# Patient Record
Sex: FEMALE | Race: WHITE | NOT HISPANIC OR LATINO
[De-identification: names, ages, dates, MRNs, and addresses within clinical notes are randomized per-mention and may not be internally consistent; named-entity substitution may affect disease eponyms.]

---

## 2020-07-28 ENCOUNTER — APPOINTMENT (OUTPATIENT)
Dept: ANTEPARTUM | Facility: CLINIC | Age: 30
End: 2020-07-28
Payer: COMMERCIAL

## 2020-07-28 PROCEDURE — 76819 FETAL BIOPHYS PROFIL W/O NST: CPT

## 2020-07-28 PROCEDURE — 76815 OB US LIMITED FETUS(S): CPT

## 2020-09-24 ENCOUNTER — TRANSCRIPTION ENCOUNTER (OUTPATIENT)
Age: 30
End: 2020-09-24

## 2020-09-25 ENCOUNTER — RESULT REVIEW (OUTPATIENT)
Age: 30
End: 2020-09-25

## 2020-09-25 ENCOUNTER — INPATIENT (INPATIENT)
Facility: HOSPITAL | Age: 30
LOS: 3 days | Discharge: HOME | End: 2020-09-29
Attending: OBSTETRICS & GYNECOLOGY | Admitting: OBSTETRICS & GYNECOLOGY
Payer: COMMERCIAL

## 2020-09-25 VITALS — HEIGHT: 64 IN | WEIGHT: 167.33 LBS

## 2020-09-25 DIAGNOSIS — Z3A.40 40 WEEKS GESTATION OF PREGNANCY: ICD-10-CM

## 2020-09-25 DIAGNOSIS — O41.1230 CHORIOAMNIONITIS, THIRD TRIMESTER, NOT APPLICABLE OR UNSPECIFIED: ICD-10-CM

## 2020-09-25 DIAGNOSIS — O26.899 OTHER SPECIFIED PREGNANCY RELATED CONDITIONS, UNSPECIFIED TRIMESTER: ICD-10-CM

## 2020-09-25 DIAGNOSIS — Z3A.00 WEEKS OF GESTATION OF PREGNANCY NOT SPECIFIED: ICD-10-CM

## 2020-09-25 DIAGNOSIS — Z34.03 ENCOUNTER FOR SUPERVISION OF NORMAL FIRST PREGNANCY, THIRD TRIMESTER: ICD-10-CM

## 2020-09-25 LAB
BASOPHILS # BLD AUTO: 0.03 K/UL — SIGNIFICANT CHANGE UP (ref 0–0.2)
BASOPHILS NFR BLD AUTO: 0.2 % — SIGNIFICANT CHANGE UP (ref 0–2)
BLD GP AB SCN SERPL QL: NEGATIVE — SIGNIFICANT CHANGE UP
EOSINOPHIL # BLD AUTO: 0.02 K/UL — SIGNIFICANT CHANGE UP (ref 0–0.5)
EOSINOPHIL NFR BLD AUTO: 0.1 % — SIGNIFICANT CHANGE UP (ref 0–6)
HBV SURFACE AG SER-ACNC: SIGNIFICANT CHANGE UP
HCT VFR BLD CALC: 39.1 % — SIGNIFICANT CHANGE UP (ref 34.5–45)
HGB BLD-MCNC: 13.3 G/DL — SIGNIFICANT CHANGE UP (ref 11.5–15.5)
HIV 1+2 AB+HIV1 P24 AG SERPL QL IA: SIGNIFICANT CHANGE UP
IMM GRANULOCYTES NFR BLD AUTO: 0.8 % — SIGNIFICANT CHANGE UP (ref 0–1.5)
LYMPHOCYTES # BLD AUTO: 1.5 K/UL — SIGNIFICANT CHANGE UP (ref 1–3.3)
LYMPHOCYTES # BLD AUTO: 8.9 % — LOW (ref 13–44)
MCHC RBC-ENTMCNC: 31.4 PG — SIGNIFICANT CHANGE UP (ref 27–34)
MCHC RBC-ENTMCNC: 34 GM/DL — SIGNIFICANT CHANGE UP (ref 32–36)
MCV RBC AUTO: 92.2 FL — SIGNIFICANT CHANGE UP (ref 80–100)
MONOCYTES # BLD AUTO: 0.59 K/UL — SIGNIFICANT CHANGE UP (ref 0–0.9)
MONOCYTES NFR BLD AUTO: 3.5 % — SIGNIFICANT CHANGE UP (ref 2–14)
NEUTROPHILS # BLD AUTO: 14.6 K/UL — HIGH (ref 1.8–7.4)
NEUTROPHILS NFR BLD AUTO: 86.5 % — HIGH (ref 43–77)
NRBC # BLD: 0 /100 WBCS — SIGNIFICANT CHANGE UP (ref 0–0)
PLATELET # BLD AUTO: 156 K/UL — SIGNIFICANT CHANGE UP (ref 150–400)
RBC # BLD: 4.24 M/UL — SIGNIFICANT CHANGE UP (ref 3.8–5.2)
RBC # FLD: 12.9 % — SIGNIFICANT CHANGE UP (ref 10.3–14.5)
RH IG SCN BLD-IMP: POSITIVE — SIGNIFICANT CHANGE UP
RH IG SCN BLD-IMP: POSITIVE — SIGNIFICANT CHANGE UP
WBC # BLD: 16.87 K/UL — HIGH (ref 3.8–10.5)
WBC # FLD AUTO: 16.87 K/UL — HIGH (ref 3.8–10.5)

## 2020-09-25 PROCEDURE — 88307 TISSUE EXAM BY PATHOLOGIST: CPT | Mod: 26

## 2020-09-25 RX ORDER — IBUPROFEN 200 MG
600 TABLET ORAL EVERY 6 HOURS
Refills: 0 | Status: COMPLETED | OUTPATIENT
Start: 2020-09-25 | End: 2021-08-24

## 2020-09-25 RX ORDER — ACETAMINOPHEN 500 MG
975 TABLET ORAL
Refills: 0 | Status: DISCONTINUED | OUTPATIENT
Start: 2020-09-26 | End: 2020-09-29

## 2020-09-25 RX ORDER — ENOXAPARIN SODIUM 100 MG/ML
40 INJECTION SUBCUTANEOUS EVERY 24 HOURS
Refills: 0 | Status: DISCONTINUED | OUTPATIENT
Start: 2020-09-26 | End: 2020-09-29

## 2020-09-25 RX ORDER — OXYTOCIN 10 UNIT/ML
333.33 VIAL (ML) INJECTION
Qty: 20 | Refills: 0 | Status: DISCONTINUED | OUTPATIENT
Start: 2020-09-25 | End: 2020-09-25

## 2020-09-25 RX ORDER — SIMETHICONE 80 MG/1
80 TABLET, CHEWABLE ORAL EVERY 4 HOURS
Refills: 0 | Status: DISCONTINUED | OUTPATIENT
Start: 2020-09-26 | End: 2020-09-29

## 2020-09-25 RX ORDER — DIPHENHYDRAMINE HCL 50 MG
25 CAPSULE ORAL EVERY 6 HOURS
Refills: 0 | Status: DISCONTINUED | OUTPATIENT
Start: 2020-09-26 | End: 2020-09-29

## 2020-09-25 RX ORDER — ONDANSETRON 8 MG/1
4 TABLET, FILM COATED ORAL EVERY 6 HOURS
Refills: 0 | Status: DISCONTINUED | OUTPATIENT
Start: 2020-09-26 | End: 2020-09-29

## 2020-09-25 RX ORDER — MAGNESIUM HYDROXIDE 400 MG/1
30 TABLET, CHEWABLE ORAL
Refills: 0 | Status: DISCONTINUED | OUTPATIENT
Start: 2020-09-26 | End: 2020-09-29

## 2020-09-25 RX ORDER — CEFAZOLIN SODIUM 1 G
2000 VIAL (EA) INJECTION EVERY 8 HOURS
Refills: 0 | Status: DISCONTINUED | OUTPATIENT
Start: 2020-09-26 | End: 2020-09-29

## 2020-09-25 RX ORDER — SODIUM CHLORIDE 9 MG/ML
1000 INJECTION, SOLUTION INTRAVENOUS
Refills: 0 | Status: DISCONTINUED | OUTPATIENT
Start: 2020-09-25 | End: 2020-09-25

## 2020-09-25 RX ORDER — OXYCODONE HYDROCHLORIDE 5 MG/1
5 TABLET ORAL
Refills: 0 | Status: COMPLETED | OUTPATIENT
Start: 2020-09-26 | End: 2020-10-03

## 2020-09-25 RX ORDER — KETOROLAC TROMETHAMINE 30 MG/ML
30 SYRINGE (ML) INJECTION EVERY 6 HOURS
Refills: 0 | Status: COMPLETED | OUTPATIENT
Start: 2020-09-25 | End: 2020-09-26

## 2020-09-25 RX ORDER — DEXAMETHASONE 0.5 MG/5ML
4 ELIXIR ORAL EVERY 6 HOURS
Refills: 0 | Status: DISCONTINUED | OUTPATIENT
Start: 2020-09-26 | End: 2020-09-29

## 2020-09-25 RX ORDER — OXYTOCIN 10 UNIT/ML
333.33 VIAL (ML) INJECTION
Qty: 20 | Refills: 0 | Status: DISCONTINUED | OUTPATIENT
Start: 2020-09-26 | End: 2020-09-29

## 2020-09-25 RX ORDER — LANOLIN
1 OINTMENT (GRAM) TOPICAL EVERY 6 HOURS
Refills: 0 | Status: DISCONTINUED | OUTPATIENT
Start: 2020-09-26 | End: 2020-09-29

## 2020-09-25 RX ORDER — NALOXONE HYDROCHLORIDE 4 MG/.1ML
0.1 SPRAY NASAL
Refills: 0 | Status: DISCONTINUED | OUTPATIENT
Start: 2020-09-26 | End: 2020-09-29

## 2020-09-25 RX ORDER — OXYCODONE HYDROCHLORIDE 5 MG/1
5 TABLET ORAL ONCE
Refills: 0 | Status: DISCONTINUED | OUTPATIENT
Start: 2020-09-26 | End: 2020-09-29

## 2020-09-25 RX ORDER — FENTANYL/BUPIVACAINE/NS/PF 2MCG/ML-.1
250 PLASTIC BAG, INJECTION (ML) INJECTION
Refills: 0 | Status: DISCONTINUED | OUTPATIENT
Start: 2020-09-25 | End: 2020-09-25

## 2020-09-25 RX ORDER — CITRIC ACID/SODIUM CITRATE 300-500 MG
15 SOLUTION, ORAL ORAL EVERY 6 HOURS
Refills: 0 | Status: DISCONTINUED | OUTPATIENT
Start: 2020-09-25 | End: 2020-09-25

## 2020-09-25 RX ORDER — MORPHINE SULFATE 50 MG/1
4 CAPSULE, EXTENDED RELEASE ORAL ONCE
Refills: 0 | Status: DISCONTINUED | OUTPATIENT
Start: 2020-09-26 | End: 2020-09-29

## 2020-09-25 RX ORDER — TETANUS TOXOID, REDUCED DIPHTHERIA TOXOID AND ACELLULAR PERTUSSIS VACCINE, ADSORBED 5; 2.5; 8; 8; 2.5 [IU]/.5ML; [IU]/.5ML; UG/.5ML; UG/.5ML; UG/.5ML
0.5 SUSPENSION INTRAMUSCULAR ONCE
Refills: 0 | Status: DISCONTINUED | OUTPATIENT
Start: 2020-09-26 | End: 2020-09-29

## 2020-09-25 RX ORDER — SODIUM CHLORIDE 9 MG/ML
1000 INJECTION, SOLUTION INTRAVENOUS
Refills: 0 | Status: DISCONTINUED | OUTPATIENT
Start: 2020-09-26 | End: 2020-09-29

## 2020-09-25 RX ADMIN — Medication 30 MILLIGRAM(S): at 23:10

## 2020-09-26 LAB
RUBV IGG SER-ACNC: 10.7 INDEX — SIGNIFICANT CHANGE UP
RUBV IGG SER-IMP: POSITIVE — SIGNIFICANT CHANGE UP
SARS-COV-2 IGG SERPL QL IA: POSITIVE
SARS-COV-2 IGM SERPL IA-ACNC: 18.7 AU/ML — HIGH
T PALLIDUM AB TITR SER: NEGATIVE — SIGNIFICANT CHANGE UP

## 2020-09-26 RX ORDER — IBUPROFEN 200 MG
600 TABLET ORAL EVERY 6 HOURS
Refills: 0 | Status: DISCONTINUED | OUTPATIENT
Start: 2020-09-26 | End: 2020-09-29

## 2020-09-26 RX ADMIN — Medication 1 APPLICATION(S): at 20:17

## 2020-09-26 RX ADMIN — Medication 100 MILLIGRAM(S): at 14:39

## 2020-09-26 RX ADMIN — SIMETHICONE 80 MILLIGRAM(S): 80 TABLET, CHEWABLE ORAL at 20:17

## 2020-09-26 RX ADMIN — Medication 600 MILLIGRAM(S): at 23:54

## 2020-09-26 RX ADMIN — Medication 30 MILLIGRAM(S): at 06:08

## 2020-09-26 RX ADMIN — Medication 600 MILLIGRAM(S): at 18:33

## 2020-09-26 RX ADMIN — Medication 975 MILLIGRAM(S): at 09:13

## 2020-09-26 RX ADMIN — Medication 975 MILLIGRAM(S): at 16:15

## 2020-09-26 RX ADMIN — Medication 30 MILLIGRAM(S): at 06:40

## 2020-09-26 RX ADMIN — Medication 30 MILLIGRAM(S): at 13:15

## 2020-09-26 RX ADMIN — Medication 975 MILLIGRAM(S): at 01:10

## 2020-09-26 RX ADMIN — Medication 975 MILLIGRAM(S): at 20:47

## 2020-09-26 RX ADMIN — Medication 30 MILLIGRAM(S): at 12:22

## 2020-09-26 RX ADMIN — Medication 975 MILLIGRAM(S): at 00:39

## 2020-09-26 RX ADMIN — Medication 975 MILLIGRAM(S): at 15:29

## 2020-09-26 RX ADMIN — Medication 100 MILLIGRAM(S): at 06:08

## 2020-09-26 RX ADMIN — Medication 975 MILLIGRAM(S): at 10:00

## 2020-09-26 RX ADMIN — ENOXAPARIN SODIUM 40 MILLIGRAM(S): 100 INJECTION SUBCUTANEOUS at 09:12

## 2020-09-26 RX ADMIN — Medication 975 MILLIGRAM(S): at 20:17

## 2020-09-26 RX ADMIN — Medication 100 MILLIGRAM(S): at 21:37

## 2020-09-26 RX ADMIN — Medication 600 MILLIGRAM(S): at 19:15

## 2020-09-26 RX ADMIN — Medication 600 MILLIGRAM(S): at 23:24

## 2020-09-26 NOTE — LACTATION INITIAL EVALUATION - NS LACT CON REASON FOR REQ
Primary C/Section for cat 2 tracing of baby boy @40.6 wks GA. . Baby SGA; BS stable @ this time. Baby almost 26 hrs old @ time of consult. Voiding & stooling appropriately for DOL. Now 2865 gms with 4% wt loss. Met dyad sitting in recliner, breastfeeding on right breast. FOB involved and supportive @ bedside. Mom states she felt pinchy nipple pain with her previous feeding. Mom has large breasts with right nipple everted, left nipple flat. Mom has been performing HE. Expressible colostrum bilaterally. Nipple everts with stimulation; nipple rolling encouraged. Hand held pump given with instructions to help maira nipple. Milk collection & storage reviewed. Demonstrated collecting colostrum with syringe. Attempted to wake baby after oral assessment. No tethering or uncoordinated suck detected. Mom was able to HE .2cc colostrum; instructed on feeding colostrum with syringe. Baby remains sleepy & not interested in further breastfeeding. Unable to observe latch @ this time. Encouraged Mom to do plenty of S2S & breastfeed on demand at least 8-12x/day from start of each feed. Breastfeeding guidelines, early feeding cues, cluster feedings & infant output requirements reviewed. All questions answered. Mom expressed understanding. Reassurance provided. Mom encouraged to ask for assistance as needed from RN or LC. At end of consult, Mom accidentally scratch her left nipple with her bracelet; clean wipe applied to bleeding nipple. Instructed to rub colostrum & apply nipple cream to area. Primary RN made aware of consult session./inverted/flat nipples/primaparous mom

## 2020-09-27 LAB
BASOPHILS # BLD AUTO: 0.02 K/UL — SIGNIFICANT CHANGE UP (ref 0–0.2)
BASOPHILS NFR BLD AUTO: 0.2 % — SIGNIFICANT CHANGE UP (ref 0–2)
EOSINOPHIL # BLD AUTO: 0.09 K/UL — SIGNIFICANT CHANGE UP (ref 0–0.5)
EOSINOPHIL NFR BLD AUTO: 0.8 % — SIGNIFICANT CHANGE UP (ref 0–6)
HCT VFR BLD CALC: 30 % — LOW (ref 34.5–45)
HGB BLD-MCNC: 10.3 G/DL — LOW (ref 11.5–15.5)
IMM GRANULOCYTES NFR BLD AUTO: 0.9 % — SIGNIFICANT CHANGE UP (ref 0–1.5)
LYMPHOCYTES # BLD AUTO: 1.49 K/UL — SIGNIFICANT CHANGE UP (ref 1–3.3)
LYMPHOCYTES # BLD AUTO: 13.1 % — SIGNIFICANT CHANGE UP (ref 13–44)
MCHC RBC-ENTMCNC: 31.9 PG — SIGNIFICANT CHANGE UP (ref 27–34)
MCHC RBC-ENTMCNC: 34.3 GM/DL — SIGNIFICANT CHANGE UP (ref 32–36)
MCV RBC AUTO: 92.9 FL — SIGNIFICANT CHANGE UP (ref 80–100)
MONOCYTES # BLD AUTO: 0.59 K/UL — SIGNIFICANT CHANGE UP (ref 0–0.9)
MONOCYTES NFR BLD AUTO: 5.2 % — SIGNIFICANT CHANGE UP (ref 2–14)
NEUTROPHILS # BLD AUTO: 9.1 K/UL — HIGH (ref 1.8–7.4)
NEUTROPHILS NFR BLD AUTO: 79.8 % — HIGH (ref 43–77)
NRBC # BLD: 0 /100 WBCS — SIGNIFICANT CHANGE UP (ref 0–0)
PLATELET # BLD AUTO: 146 K/UL — LOW (ref 150–400)
RBC # BLD: 3.23 M/UL — LOW (ref 3.8–5.2)
RBC # FLD: 13.1 % — SIGNIFICANT CHANGE UP (ref 10.3–14.5)
WBC # BLD: 11.39 K/UL — HIGH (ref 3.8–10.5)
WBC # FLD AUTO: 11.39 K/UL — HIGH (ref 3.8–10.5)

## 2020-09-27 RX ORDER — OXYCODONE HYDROCHLORIDE 5 MG/1
5 TABLET ORAL
Refills: 0 | Status: DISCONTINUED | OUTPATIENT
Start: 2020-09-27 | End: 2020-09-29

## 2020-09-27 RX ADMIN — SIMETHICONE 80 MILLIGRAM(S): 80 TABLET, CHEWABLE ORAL at 11:18

## 2020-09-27 RX ADMIN — Medication 975 MILLIGRAM(S): at 11:10

## 2020-09-27 RX ADMIN — Medication 600 MILLIGRAM(S): at 12:59

## 2020-09-27 RX ADMIN — MAGNESIUM HYDROXIDE 30 MILLILITER(S): 400 TABLET, CHEWABLE ORAL at 20:01

## 2020-09-27 RX ADMIN — Medication 600 MILLIGRAM(S): at 19:20

## 2020-09-27 RX ADMIN — Medication 975 MILLIGRAM(S): at 20:57

## 2020-09-27 RX ADMIN — OXYCODONE HYDROCHLORIDE 5 MILLIGRAM(S): 5 TABLET ORAL at 11:18

## 2020-09-27 RX ADMIN — MAGNESIUM HYDROXIDE 30 MILLILITER(S): 400 TABLET, CHEWABLE ORAL at 05:14

## 2020-09-27 RX ADMIN — Medication 600 MILLIGRAM(S): at 05:44

## 2020-09-27 RX ADMIN — OXYCODONE HYDROCHLORIDE 5 MILLIGRAM(S): 5 TABLET ORAL at 20:57

## 2020-09-27 RX ADMIN — SIMETHICONE 80 MILLIGRAM(S): 80 TABLET, CHEWABLE ORAL at 05:14

## 2020-09-27 RX ADMIN — OXYCODONE HYDROCHLORIDE 5 MILLIGRAM(S): 5 TABLET ORAL at 19:58

## 2020-09-27 RX ADMIN — Medication 600 MILLIGRAM(S): at 18:24

## 2020-09-27 RX ADMIN — Medication 975 MILLIGRAM(S): at 10:08

## 2020-09-27 RX ADMIN — Medication 975 MILLIGRAM(S): at 21:57

## 2020-09-27 RX ADMIN — Medication 975 MILLIGRAM(S): at 03:46

## 2020-09-27 RX ADMIN — OXYCODONE HYDROCHLORIDE 5 MILLIGRAM(S): 5 TABLET ORAL at 12:00

## 2020-09-27 RX ADMIN — Medication 975 MILLIGRAM(S): at 16:30

## 2020-09-27 RX ADMIN — Medication 975 MILLIGRAM(S): at 03:16

## 2020-09-27 RX ADMIN — Medication 600 MILLIGRAM(S): at 13:45

## 2020-09-27 RX ADMIN — ENOXAPARIN SODIUM 40 MILLIGRAM(S): 100 INJECTION SUBCUTANEOUS at 10:09

## 2020-09-27 RX ADMIN — Medication 975 MILLIGRAM(S): at 15:35

## 2020-09-27 RX ADMIN — Medication 600 MILLIGRAM(S): at 05:14

## 2020-09-27 RX ADMIN — Medication 600 MILLIGRAM(S): at 23:40

## 2020-09-27 NOTE — PROGRESS NOTE ADULT - ATTENDING COMMENTS
Patient seen and examined at bedside. Agree with above evaluation and assessment, plan for discharge POD 3 or 4 if hemodynamically stable
Patient seen and examined at bedside. Agree with above evaluation and assessment. Continue routine postop care.

## 2020-09-28 ENCOUNTER — TRANSCRIPTION ENCOUNTER (OUTPATIENT)
Age: 30
End: 2020-09-28

## 2020-09-28 RX ORDER — ACETAMINOPHEN 500 MG
3 TABLET ORAL
Qty: 0 | Refills: 0 | DISCHARGE
Start: 2020-09-28

## 2020-09-28 RX ORDER — IBUPROFEN 200 MG
1 TABLET ORAL
Qty: 0 | Refills: 0 | DISCHARGE
Start: 2020-09-28

## 2020-09-28 RX ADMIN — Medication 975 MILLIGRAM(S): at 09:14

## 2020-09-28 RX ADMIN — ENOXAPARIN SODIUM 40 MILLIGRAM(S): 100 INJECTION SUBCUTANEOUS at 09:15

## 2020-09-28 RX ADMIN — Medication 975 MILLIGRAM(S): at 21:30

## 2020-09-28 RX ADMIN — OXYCODONE HYDROCHLORIDE 5 MILLIGRAM(S): 5 TABLET ORAL at 10:18

## 2020-09-28 RX ADMIN — OXYCODONE HYDROCHLORIDE 5 MILLIGRAM(S): 5 TABLET ORAL at 11:00

## 2020-09-28 RX ADMIN — Medication 600 MILLIGRAM(S): at 13:45

## 2020-09-28 RX ADMIN — Medication 975 MILLIGRAM(S): at 15:06

## 2020-09-28 RX ADMIN — Medication 600 MILLIGRAM(S): at 06:45

## 2020-09-28 RX ADMIN — Medication 600 MILLIGRAM(S): at 18:15

## 2020-09-28 RX ADMIN — Medication 975 MILLIGRAM(S): at 16:06

## 2020-09-28 RX ADMIN — OXYCODONE HYDROCHLORIDE 5 MILLIGRAM(S): 5 TABLET ORAL at 18:15

## 2020-09-28 RX ADMIN — Medication 975 MILLIGRAM(S): at 04:30

## 2020-09-28 RX ADMIN — Medication 975 MILLIGRAM(S): at 10:10

## 2020-09-28 RX ADMIN — Medication 600 MILLIGRAM(S): at 19:11

## 2020-09-28 RX ADMIN — Medication 975 MILLIGRAM(S): at 03:46

## 2020-09-28 RX ADMIN — Medication 600 MILLIGRAM(S): at 00:40

## 2020-09-28 RX ADMIN — Medication 600 MILLIGRAM(S): at 23:49

## 2020-09-28 RX ADMIN — OXYCODONE HYDROCHLORIDE 5 MILLIGRAM(S): 5 TABLET ORAL at 19:11

## 2020-09-28 RX ADMIN — Medication 975 MILLIGRAM(S): at 20:53

## 2020-09-28 RX ADMIN — Medication 600 MILLIGRAM(S): at 12:50

## 2020-09-28 RX ADMIN — Medication 600 MILLIGRAM(S): at 06:06

## 2020-09-28 NOTE — DISCHARGE NOTE OB - CARE PROVIDER_API CALL
Jamilah Chauhan  OBSTETRICS AND GYNECOLOGY  [facility] [place] [address]  Phone: [phone]  Fax: [phone]  Follow Up Time:

## 2020-09-29 VITALS
HEART RATE: 85 BPM | RESPIRATION RATE: 17 BRPM | DIASTOLIC BLOOD PRESSURE: 71 MMHG | TEMPERATURE: 98 F | SYSTOLIC BLOOD PRESSURE: 114 MMHG | OXYGEN SATURATION: 98 %

## 2020-09-29 PROCEDURE — 86769 SARS-COV-2 COVID-19 ANTIBODY: CPT

## 2020-09-29 PROCEDURE — 85025 COMPLETE CBC W/AUTO DIFF WBC: CPT

## 2020-09-29 PROCEDURE — 87389 HIV-1 AG W/HIV-1&-2 AB AG IA: CPT

## 2020-09-29 PROCEDURE — 90686 IIV4 VACC NO PRSV 0.5 ML IM: CPT

## 2020-09-29 PROCEDURE — 86901 BLOOD TYPING SEROLOGIC RH(D): CPT

## 2020-09-29 PROCEDURE — 86900 BLOOD TYPING SEROLOGIC ABO: CPT

## 2020-09-29 PROCEDURE — 86780 TREPONEMA PALLIDUM: CPT

## 2020-09-29 PROCEDURE — 86850 RBC ANTIBODY SCREEN: CPT

## 2020-09-29 PROCEDURE — 87340 HEPATITIS B SURFACE AG IA: CPT

## 2020-09-29 PROCEDURE — 86762 RUBELLA ANTIBODY: CPT

## 2020-09-29 PROCEDURE — 99214 OFFICE O/P EST MOD 30 MIN: CPT

## 2020-09-29 PROCEDURE — 36415 COLL VENOUS BLD VENIPUNCTURE: CPT

## 2020-09-29 PROCEDURE — 88307 TISSUE EXAM BY PATHOLOGIST: CPT

## 2020-09-29 RX ORDER — INFLUENZA VIRUS VACCINE 15; 15; 15; 15 UG/.5ML; UG/.5ML; UG/.5ML; UG/.5ML
0.5 SUSPENSION INTRAMUSCULAR ONCE
Refills: 0 | Status: COMPLETED | OUTPATIENT
Start: 2020-09-29 | End: 2020-09-29

## 2020-09-29 RX ADMIN — Medication 600 MILLIGRAM(S): at 12:50

## 2020-09-29 RX ADMIN — Medication 975 MILLIGRAM(S): at 10:17

## 2020-09-29 RX ADMIN — Medication 975 MILLIGRAM(S): at 09:12

## 2020-09-29 RX ADMIN — ENOXAPARIN SODIUM 40 MILLIGRAM(S): 100 INJECTION SUBCUTANEOUS at 09:12

## 2020-09-29 RX ADMIN — Medication 600 MILLIGRAM(S): at 00:20

## 2020-09-29 RX ADMIN — INFLUENZA VIRUS VACCINE 0.5 MILLILITER(S): 15; 15; 15; 15 SUSPENSION INTRAMUSCULAR at 11:55

## 2020-09-29 RX ADMIN — OXYCODONE HYDROCHLORIDE 5 MILLIGRAM(S): 5 TABLET ORAL at 09:12

## 2020-09-29 RX ADMIN — Medication 975 MILLIGRAM(S): at 03:09

## 2020-09-29 RX ADMIN — Medication 600 MILLIGRAM(S): at 11:53

## 2020-09-29 RX ADMIN — Medication 600 MILLIGRAM(S): at 06:16

## 2020-09-29 RX ADMIN — Medication 975 MILLIGRAM(S): at 03:40

## 2020-09-29 RX ADMIN — Medication 600 MILLIGRAM(S): at 06:45

## 2020-09-29 RX ADMIN — Medication 1 APPLICATION(S): at 11:53

## 2020-09-29 NOTE — PROGRESS NOTE ADULT - ASSESSMENT
28yo POD#4 s/p LTCS.  Patient is stable and is doing well post-operatively.
30yo POD#3 s/p LTCS.  Patient is stable and is doing well post-operatively.
A/P: 29y s/p  section, POD#2, stable  -  Pain: PO motrin q6hrs, tylenol q6hrs, oxycodone for severe pain PRN  -  Post-operatively labs: post-op Hgb , hemodynamically stable, no symptoms of anemia   -  GI: tolerating regular diet, passing gas  -  : s/p kirk , urinating without difficulty  -  DVT prophylaxis: encouraged increased ambulation, SCDs, lovenox  -  Dispo: POD 3 or 4
28yo POD#1 s/p LTCS.  Patient is stable and doing well post-operatively.

## 2020-09-29 NOTE — PROGRESS NOTE ADULT - PROBLEM SELECTOR PLAN 1
- Continue regular diet.  - Increase ambulation; lovenox and SCDs   - Continue motrin, tylenol, oxycodone PRN for pain control.  - D/c jovan gusman DTV   - F/u [facility]    Yasmani Amor PGY2
- Continue motrin, tylenol, oxycodone PRN for pain control.  - Increase ambulation; lovenox and SCDs   - Continue regular diet  - Discharge planning    Yasmani Amor PGY2
- Continue motrin, tylenol, oxycodone PRN for pain control.  - Increase ambulation; lovenox and SCDs   - Continue regular diet  - Discharge planning    Yasmani Amor PGY2

## 2020-09-29 NOTE — PROGRESS NOTE ADULT - SUBJECTIVE AND OBJECTIVE BOX
OB Postpartum Note:  Delivery, POD#3    S: 30yo POD#3 s/p LTCS. The patient feels well.  Pain is well controlled. She is tolerating a regular diet and passing flatus. She is voiding spontaneously, and ambulating without difficulty. Denies CP/SOB. Denies lightheadedness/dizziness. Denies N/V. Denies heavy vaginal bleeding.     O:  Vitals:  Vital Signs Last 24 Hrs  T(C): 36.7 (27 Sep 2020 21:39), Max: 36.8 (27 Sep 2020 14:00)  T(F): 98.1 (27 Sep 2020 21:39), Max: 98.3 (27 Sep 2020 14:00)  HR: 92 (27 Sep 2020 21:39) (80 - 98)  BP: 106/60 (27 Sep 2020 21:39) (98/57 - 110/70)  BP(mean): --  RR: 17 (27 Sep 2020 21:39) (17 - 18)  SpO2: 97% (27 Sep 2020 21:39) (97% - 97%)    MEDICATIONS  (STANDING):  acetaminophen   Tablet .. 975 milliGRAM(s) Oral <User Schedule>  ceFAZolin   IVPB 2000 milliGRAM(s) IV Intermittent every 8 hours  ceFAZolin   IVPB 2000 milliGRAM(s) IV Intermittent every 8 hours  diphtheria/tetanus/pertussis (acellular) Vaccine (ADAcel) 0.5 milliLiter(s) IntraMuscular once  enoxaparin Injectable 40 milliGRAM(s) SubCutaneous every 24 hours  ibuprofen  Tablet. 600 milliGRAM(s) Oral every 6 hours  lactated ringers. 1000 milliLiter(s) (125 mL/Hr) IV Continuous <Continuous>  morphine PF Epidural 4 milliGRAM(s) Epidural once  oxytocin Infusion 333.333 milliUNIT(s)/Min (1000 mL/Hr) IV Continuous <Continuous>    MEDICATIONS  (PRN):  dexAMETHasone  Injectable 4 milliGRAM(s) IV Push every 6 hours PRN Nausea  diphenhydrAMINE 25 milliGRAM(s) Oral every 6 hours PRN Itching  lanolin Ointment 1 Application(s) Topical every 6 hours PRN Sore Nipples  magnesium hydroxide Suspension 30 milliLiter(s) Oral two times a day PRN Constipation  naloxone Injectable 0.1 milliGRAM(s) IV Push every 3 minutes PRN For ANY of the following changes in patient status:  A. Breaths Per Minute LESS THAN 10, B. Oxygen saturation LESS THAN 90%, C. Sedation score of 6 for Stop After: 4 Times  ondansetron Injectable 4 milliGRAM(s) IV Push every 6 hours PRN Nausea  oxyCODONE    IR 5 milliGRAM(s) Oral every 3 hours PRN Moderate to Severe Pain (4-10)  oxyCODONE    IR 5 milliGRAM(s) Oral once PRN Moderate to Severe Pain (4-10)  simethicone 80 milliGRAM(s) Chew every 4 hours PRN Gas      LABS:  Blood type: O Positive  Rubella IgG: Positive ( @ 02:12)  RPR: Negative                          10.3<L>   11.39<H> >-----------< 146<L>    (  @ 07:13 )             30.0<L>                        13.3   16.87<H> >-----------< 156    (  @ 19:54 )             39.1            Physical exam:  Gen: NAD  Abdomen: Soft, nontender, no distension , firm uterine fundus at umbilicus.  Incision: Clean, dry, and intact, steris in place    Pelvic: Normal lochia noted  Ext: No calf tenderness          
OB Postpartum Note:  Delivery, POD#4    S: 30yo POD#4 s/p LTCS. The patient feels well.  Pain is well controlled. She is tolerating a regular diet and passing flatus. She is voiding spontaneously, and ambulating without difficulty. Denies CP/SOB. Denies lightheadedness/dizziness. Denies N/V. Denies heavy vaginal bleeding.     O:  Vitals:  Vital Signs Last 24 Hrs  T(C): 36.6 (28 Sep 2020 22:33), Max: 36.8 (28 Sep 2020 10:00)  T(F): 97.8 (28 Sep 2020 22:33), Max: 98.3 (28 Sep 2020 10:00)  HR: 84 (28 Sep 2020 22:33) (78 - 84)  BP: 127/67 (28 Sep 2020 22:33) (105/71 - 127/67)  BP(mean): --  RR: 18 (28 Sep 2020 22:33) (17 - 18)  SpO2: 98% (28 Sep 2020 22:33) (96% - 98%)    MEDICATIONS  (STANDING):  acetaminophen   Tablet .. 975 milliGRAM(s) Oral <User Schedule>  ceFAZolin   IVPB 2000 milliGRAM(s) IV Intermittent every 8 hours  ceFAZolin   IVPB 2000 milliGRAM(s) IV Intermittent every 8 hours  diphtheria/tetanus/pertussis (acellular) Vaccine (ADAcel) 0.5 milliLiter(s) IntraMuscular once  enoxaparin Injectable 40 milliGRAM(s) SubCutaneous every 24 hours  ibuprofen  Tablet. 600 milliGRAM(s) Oral every 6 hours  lactated ringers. 1000 milliLiter(s) (125 mL/Hr) IV Continuous <Continuous>  morphine PF Epidural 4 milliGRAM(s) Epidural once  oxytocin Infusion 333.333 milliUNIT(s)/Min (1000 mL/Hr) IV Continuous <Continuous>    MEDICATIONS  (PRN):  dexAMETHasone  Injectable 4 milliGRAM(s) IV Push every 6 hours PRN Nausea  diphenhydrAMINE 25 milliGRAM(s) Oral every 6 hours PRN Itching  lanolin Ointment 1 Application(s) Topical every 6 hours PRN Sore Nipples  magnesium hydroxide Suspension 30 milliLiter(s) Oral two times a day PRN Constipation  naloxone Injectable 0.1 milliGRAM(s) IV Push every 3 minutes PRN For ANY of the following changes in patient status:  A. Breaths Per Minute LESS THAN 10, B. Oxygen saturation LESS THAN 90%, C. Sedation score of 6 for Stop After: 4 Times  ondansetron Injectable 4 milliGRAM(s) IV Push every 6 hours PRN Nausea  oxyCODONE    IR 5 milliGRAM(s) Oral every 3 hours PRN Moderate to Severe Pain (4-10)  oxyCODONE    IR 5 milliGRAM(s) Oral once PRN Moderate to Severe Pain (4-10)  simethicone 80 milliGRAM(s) Chew every 4 hours PRN Gas      LABS:  Blood type: O Positive  Rubella IgG: Positive ( @ 02:12)  RPR: Negative                          10.3<L>   11.39<H> >-----------< 146<L>    (  @ 07:13 )             30.0<L>                Physical exam:  Gen: NAD  Abdomen: Soft, nontender, no distension , firm uterine fundus at umbilicus.  Incision: Clean, dry, and intact   Pelvic: Normal lochia noted  Ext: No calf tenderness        
Patient evaluated at bedside this morning, resting comfortable in bed.   She reports pain is well controlled with motrin and tylenol.  She denies headache, dizziness, chest pain, palpitations, shortness of breath, nausea, vomiting or heavy vaginal bleeding.  She has been ambulating without assistance, voiding spontaneously, passing gas, tolerating regular diet and is breastfeeding.    Physical Exam:  Vital Signs Last 24 Hrs  T(C): 36.3 (27 Sep 2020 06:00), Max: 36.8 (26 Sep 2020 10:00)  T(F): 97.4 (27 Sep 2020 06:00), Max: 98.3 (26 Sep 2020 14:00)  HR: 80 (27 Sep 2020 06:00) (76 - 83)  BP: 99/62 (27 Sep 2020 06:00) (98/57 - 101/64)  BP(mean): --  RR: 18 (27 Sep 2020 06:00) (17 - 18)  SpO2: 97% (27 Sep 2020 06:00) (96% - 97%)    GA: NAD, A+0 x 3  CV: RRR  Pulm: CTAB  Breasts: soft, nontender, no palpable masses  Abd: + BS, soft, nontender, nondistended, no rebound or guarding, incision clean, dry and intact, uterus firm at midline and below umbilicus  : lochia WNL  Extremities: no swelling or calf tenderness                             13.3   16.87 )-----------( 156      ( 25 Sep 2020 19:54 )             39.1               
OB Progress Note:  Delivery, POD#1    S: 30yo POD#1 s/p urgent LTCS for category 2 tracing. Her pain is well controlled. She is tolerating a regular diet and passing flatus. Denies N/V. Denies CP/SOB/lightheadedness/dizziness.   Has not ambulated.   Martinez in place with clear urine.   Denies heavy vaginal bleeding.     O:   Vital Signs Last 24 Hrs  T(C): 36.9 (26 Sep 2020 06:15), Max: 37.1 (26 Sep 2020 00:34)  T(F): 98.5 (26 Sep 2020 06:15), Max: 98.8 (26 Sep 2020 00:34)  HR: 82 (26 Sep 2020 06:15) (74 - 96)  BP: 101/61 (26 Sep 2020 06:15) (90/52 - 118/68)  BP(mean): --  RR: 17 (26 Sep 2020 06:15) (17 - 20)  SpO2: 96% (26 Sep 2020 06:15) (95% - 98%)    Labs:  Blood type: O Positive  Rubella IgG: RPR:                           13.3   16.87<H> >-----------< 156    (  @ 19:54 )             39.1          PE:  General: NAD  Abdomen: Mildly distended, appropriately tender, incision c/d/i.  Extremities: No erythema, no pitting edema

## 2020-10-01 LAB — SURGICAL PATHOLOGY STUDY: SIGNIFICANT CHANGE UP

## 2020-10-01 NOTE — PATIENT PROFILE OB - AMNIOTIC FLUID AMOUNT, LABOR
Writer spoke to patient to convey test results as reported below. Patient had no further questions.   within normal limits

## 2021-02-17 PROBLEM — Z00.00 ENCOUNTER FOR PREVENTIVE HEALTH EXAMINATION: Status: ACTIVE | Noted: 2021-02-17

## 2021-09-09 ENCOUNTER — TRANSCRIPTION ENCOUNTER (OUTPATIENT)
Age: 31
End: 2021-09-09

## 2022-06-01 ENCOUNTER — EMERGENCY (EMERGENCY)
Facility: HOSPITAL | Age: 32
LOS: 1 days | Discharge: HOME | End: 2022-06-01
Attending: EMERGENCY MEDICINE | Admitting: EMERGENCY MEDICINE
Payer: COMMERCIAL

## 2022-06-01 VITALS
OXYGEN SATURATION: 97 % | HEIGHT: 64 IN | WEIGHT: 125 LBS | HEART RATE: 87 BPM | DIASTOLIC BLOOD PRESSURE: 71 MMHG | SYSTOLIC BLOOD PRESSURE: 107 MMHG | TEMPERATURE: 99 F | RESPIRATION RATE: 16 BRPM

## 2022-06-01 DIAGNOSIS — O99.891 OTHER SPECIFIED DISEASES AND CONDITIONS COMPLICATING PREGNANCY: ICD-10-CM

## 2022-06-01 DIAGNOSIS — Z20.822 CONTACT WITH AND (SUSPECTED) EXPOSURE TO COVID-19: ICD-10-CM

## 2022-06-01 DIAGNOSIS — O21.9 VOMITING OF PREGNANCY, UNSPECIFIED: ICD-10-CM

## 2022-06-01 DIAGNOSIS — R19.7 DIARRHEA, UNSPECIFIED: ICD-10-CM

## 2022-06-01 DIAGNOSIS — R10.9 UNSPECIFIED ABDOMINAL PAIN: ICD-10-CM

## 2022-06-01 DIAGNOSIS — Z3A.08 8 WEEKS GESTATION OF PREGNANCY: ICD-10-CM

## 2022-06-01 PROCEDURE — 99284 EMERGENCY DEPT VISIT MOD MDM: CPT

## 2022-06-01 RX ORDER — ONDANSETRON 8 MG/1
4 TABLET, FILM COATED ORAL ONCE
Refills: 0 | Status: COMPLETED | OUTPATIENT
Start: 2022-06-01 | End: 2022-06-02

## 2022-06-01 RX ORDER — SODIUM CHLORIDE 9 MG/ML
1000 INJECTION INTRAMUSCULAR; INTRAVENOUS; SUBCUTANEOUS ONCE
Refills: 0 | Status: COMPLETED | OUTPATIENT
Start: 2022-06-01 | End: 2022-06-01

## 2022-06-01 RX ADMIN — SODIUM CHLORIDE 1000 MILLILITER(S): 9 INJECTION INTRAMUSCULAR; INTRAVENOUS; SUBCUTANEOUS at 23:31

## 2022-06-01 NOTE — ED ADULT NURSE NOTE - NSFALLRSKPASTHIST_ED_ALL_ED
[facility]  1950  [id number]    Situation:  Verbal report given from: Earl JEFFRIES and Anai ARANA  Procedure: Procedure(s):  COLONOSCOPY WITH BIOPSY    Background:    Preoperative diagnosis: RECTAL BLEEDING    Postoperative diagnosis: Sigmoid Tics    :  Dr. Luis M Fallon    Assistant(s): Circ-1: Shanti Giles RN  Circ-2: Kar Short RN  Scrub Tech-1: Cass Taveras    Specimens:   ID Type Source Tests Collected by Time Destination   1 : RECTAL MUCOSA BIOPSY Preservative   Noel Cabello MD 5/3/2017 1106 Pathology       Assessment:  Intra-procedure medications   Propofol 200 mg      Anesthesia gave intra-procedure sedation and medications, see anesthesia flow sheet     Intravenous fluids: LR@ KVO     Vital signs stable     Abdominal assessment: round and soft       Recommendation:    Permission to share finding with family or friend yes    All side rails up, bed in low position, wheels locked. Nurse at bedside. 1140 Clarified with  that pt is to refrain from taking aspirin,ibuprofen, motrin, advil x 5 days. 1147 Discharged to home via/wc,accompanied to car per RN. Skin warm and dry, awake and alert. Respirations even, unlabored. Pt and family members questions and concerns addressed prior to discharge. All belongings with pt. no

## 2022-06-01 NOTE — ED PROVIDER NOTE - CLINICAL SUMMARY MEDICAL DECISION MAKING FREE TEXT BOX
n/v/abd pain- b9 abd- 8 weeks preg- no gyn sx- had us at 6 weeks w good fht, hydrate, reassess n/v/abd pain- b9 abd- 8 weeks preg- no gyn sx- had us at 6 weeks w good fht, hydrate, reassess  tolerating po, feeling much better

## 2022-06-01 NOTE — ED ADULT NURSE NOTE - OBJECTIVE STATEMENT
Pt is 31F 8weeks gestation (IVF) L1 presents to ED c/o x1day of N/V/D, unable to tolerate PO. Pt has toddler at home with similar sx. Pt denies vaginal bleeding, urinary sx, blood in stool, hematemesis. Of note, pt returned from [place]  night 22. Pt A&Ox4, ambulatory with steady gait, speaking in clear/full sentences, no acute distress, vital signs stable.

## 2022-06-01 NOTE — ED ADULT NURSE NOTE - CHIEF COMPLAINT QUOTE
pt 8 weeks pregnant just returned from [place] complaining of N/V/D since yesterday with abd pain and cramping much worse today denies vaginal bleeding

## 2022-06-01 NOTE — ED PROVIDER NOTE - OBJECTIVE STATEMENT
31 F  co abd pain n/v- n/v multiple times earlier today- with diarrhea 3 times watery stool w crampy abd pain- sx started suddenly this evening  no f/c + sick contacts- son ill w gi illness yesterday - mild mod severity- sx improving- except for nausea and still feels dehydrated

## 2022-06-01 NOTE — ED PROVIDER NOTE - CPE EDP NEURO NORM
"Spoke with pt, and explained [facility] is out of the [facility] network. Directed Pt's care to Dr. Molly Encarnacion, who is a Functional Medicine Provider at [facility], which is temporary closed but she is currently working out of the [facility]. [facility] central scheduling telephone number [phone] will have Dr. Encarnacion's schedule for all her locations. Dr. Encarnacion also sees patients at [facility] \"[facility]\" clinic which is comparable to \"[facility].\"    DAVE Zambrano [facility] Referral Rep  "
normal...

## 2022-06-01 NOTE — ED PROVIDER NOTE - NSFOLLOWUPINSTRUCTIONS_ED_ALL_ED_FT
Nausea and Vomiting, Adult      Nausea is the feeling that you have an upset stomach or that you are about to vomit. Vomiting is when stomach contents are thrown up and out of the mouth as a result of nausea. Vomiting can make you feel weak and cause you to become dehydrated.    Dehydration can make you feel tired and thirsty, cause you to have a dry mouth, and decrease how often you urinate. Older adults and people with other diseases or a weak disease-fighting system (immune system) are at higher risk for dehydration. It is important to treat your nausea and vomiting as told by your health care provider.      Follow these instructions at home:    Watch your symptoms for any changes. Tell your health care provider about them. Follow these instructions to care for yourself at home.      Eating and drinking                   •Take an oral rehydration solution (ORS). This is a drink that is sold at pharmacies and retail stores.      •Drink clear fluids slowly and in small amounts as you are able. Clear fluids include water, ice chips, low-calorie sports drinks, and fruit juice that has water added (diluted fruit juice).      •Eat bland, easy-to-digest foods in small amounts as you are able. These foods include bananas, applesauce, rice, lean meats, toast, and crackers.      •Avoid fluids that contain a lot of sugar or caffeine, such as energy drinks, sports drinks, and soda.      •Avoid alcohol.      •Avoid spicy or fatty foods.      General instructions     •Take over-the-counter and prescription medicines only as told by your health care provider.      •Drink enough fluid to keep your urine pale yellow.      •Wash your hands often using soap and water. If soap and water are not available, use hand .      •Make sure that all people in your household wash their hands well and often.      •Rest at home while you recover.      •Watch your condition for any changes.      •Breathe slowly and deeply when you feel nauseated.      •Keep all follow-up visits as told by your health care provider. This is important.        Contact a health care provider if:    •Your symptoms get worse.      •You have new symptoms.      •You have a fever.      •You cannot drink fluids without vomiting.      •Your nausea does not go away after 2 days.      •You feel light-headed or dizzy.      •You have a headache.      •You have muscle cramps.      •You have a rash.      •You have pain while urinating.        Get help right away if:    •You have pain in your chest, neck, arm, or jaw.      •You feel extremely weak or you faint.      •You have persistent vomiting.      •You have vomit that is bright red or looks like black coffee grounds.      •You have bloody or black stools or stools that look like tar.      •You have a severe headache, a stiff neck, or both.      •You have severe pain, cramping, or bloating in your abdomen.      •You have difficulty breathing, or you are breathing very quickly.      •Your heart is beating very quickly.      •Your skin feels cold and clammy.      •You feel confused.    •You have signs of dehydration, such as:  •Dark urine, very little urine, or no urine.      •Cracked lips.      •Dry mouth.      •Sunken eyes.      •Sleepiness.      •Weakness.        These symptoms may represent a serious problem that is an emergency. Do not wait to see if the symptoms will go away. Get medical help right away. Call your local emergency services (911 in the [place] Do not drive yourself to the hospital.       Summary    •Nausea is the feeling that you have an upset stomach or that you are about to vomit. As nausea gets worse, it can lead to vomiting. Vomiting can make you feel weak and cause you to become dehydrated.      •Follow instructions from your health care provider about eating and drinking to prevent dehydration.      •Take over-the-counter and prescription medicines only as told by your health care provider.      •Contact your health care provider if your symptoms get worse, or you have new symptoms.      •Keep all follow-up visits as told by your health care provider. This is important.      This information is not intended to replace advice given to you by your health care provider. Make sure you discuss any questions you have with your health care provider.      Document Revised: 03/09/2022 Document Reviewed: 05/28/2019    Elsevier Patient Education © 2022 [employer] Inc[employer]

## 2022-06-01 NOTE — ED PROVIDER NOTE - PATIENT PORTAL LINK FT
You can access the FollowMyHealth Patient Portal offered by [facility] by registering at the following website: [email or web address]. By joining [facility]’s FollowMyHealth portal, you will also be able to view your health information using other applications (apps) compatible with our system.

## 2022-06-01 NOTE — ED ADULT TRIAGE NOTE - CHIEF COMPLAINT QUOTE
pt 8 weeks pregnant just returned from [place] complaining of N/V/D since yesterday with abd pain and cramping much worse today denies vaginal bleeding

## 2022-06-02 VITALS
DIASTOLIC BLOOD PRESSURE: 69 MMHG | RESPIRATION RATE: 16 BRPM | OXYGEN SATURATION: 98 % | SYSTOLIC BLOOD PRESSURE: 107 MMHG | HEART RATE: 82 BPM | TEMPERATURE: 98 F

## 2022-06-02 LAB
ALBUMIN SERPL ELPH-MCNC: 4 G/DL — SIGNIFICANT CHANGE UP (ref 3.3–5)
ALP SERPL-CCNC: 53 U/L — SIGNIFICANT CHANGE UP (ref 40–120)
ALT FLD-CCNC: 15 U/L — SIGNIFICANT CHANGE UP (ref 10–45)
ANION GAP SERPL CALC-SCNC: 11 MMOL/L — SIGNIFICANT CHANGE UP (ref 5–17)
APPEARANCE UR: CLEAR — SIGNIFICANT CHANGE UP
AST SERPL-CCNC: 18 U/L — SIGNIFICANT CHANGE UP (ref 10–40)
B-OH-BUTYR SERPL-SCNC: 0.2 MMOL/L — SIGNIFICANT CHANGE UP
BASOPHILS # BLD AUTO: 0.02 K/UL — SIGNIFICANT CHANGE UP (ref 0–0.2)
BASOPHILS NFR BLD AUTO: 0.2 % — SIGNIFICANT CHANGE UP (ref 0–2)
BILIRUB SERPL-MCNC: 0.8 MG/DL — SIGNIFICANT CHANGE UP (ref 0.2–1.2)
BILIRUB UR-MCNC: NEGATIVE — SIGNIFICANT CHANGE UP
BUN SERPL-MCNC: 13 MG/DL — SIGNIFICANT CHANGE UP (ref 7–23)
CALCIUM SERPL-MCNC: 8.6 MG/DL — SIGNIFICANT CHANGE UP (ref 8.4–10.5)
CHLORIDE SERPL-SCNC: 100 MMOL/L — SIGNIFICANT CHANGE UP (ref 96–108)
CO2 SERPL-SCNC: 22 MMOL/L — SIGNIFICANT CHANGE UP (ref 22–31)
COLOR SPEC: YELLOW — SIGNIFICANT CHANGE UP
CREAT SERPL-MCNC: 0.6 MG/DL — SIGNIFICANT CHANGE UP (ref 0.5–1.3)
DIFF PNL FLD: NEGATIVE — SIGNIFICANT CHANGE UP
EGFR: 123 ML/MIN/1.73M2 — SIGNIFICANT CHANGE UP
EOSINOPHIL # BLD AUTO: 0.09 K/UL — SIGNIFICANT CHANGE UP (ref 0–0.5)
EOSINOPHIL NFR BLD AUTO: 0.9 % — SIGNIFICANT CHANGE UP (ref 0–6)
GLUCOSE SERPL-MCNC: 109 MG/DL — HIGH (ref 70–99)
GLUCOSE UR QL: NEGATIVE — SIGNIFICANT CHANGE UP
HCG SERPL-ACNC: HIGH MIU/ML
HCT VFR BLD CALC: 35.3 % — SIGNIFICANT CHANGE UP (ref 34.5–45)
HGB BLD-MCNC: 12.8 G/DL — SIGNIFICANT CHANGE UP (ref 11.5–15.5)
IMM GRANULOCYTES NFR BLD AUTO: 0.4 % — SIGNIFICANT CHANGE UP (ref 0–1.5)
KETONES UR-MCNC: 15 MG/DL
LEUKOCYTE ESTERASE UR-ACNC: NEGATIVE — SIGNIFICANT CHANGE UP
LIDOCAIN IGE QN: 33 U/L — SIGNIFICANT CHANGE UP (ref 7–60)
LYMPHOCYTES # BLD AUTO: 0.52 K/UL — LOW (ref 1–3.3)
LYMPHOCYTES # BLD AUTO: 5.5 % — LOW (ref 13–44)
MCHC RBC-ENTMCNC: 32.4 PG — SIGNIFICANT CHANGE UP (ref 27–34)
MCHC RBC-ENTMCNC: 36.3 GM/DL — HIGH (ref 32–36)
MCV RBC AUTO: 89.4 FL — SIGNIFICANT CHANGE UP (ref 80–100)
MONOCYTES # BLD AUTO: 0.2 K/UL — SIGNIFICANT CHANGE UP (ref 0–0.9)
MONOCYTES NFR BLD AUTO: 2.1 % — SIGNIFICANT CHANGE UP (ref 2–14)
NEUTROPHILS # BLD AUTO: 8.63 K/UL — HIGH (ref 1.8–7.4)
NEUTROPHILS NFR BLD AUTO: 90.9 % — HIGH (ref 43–77)
NITRITE UR-MCNC: NEGATIVE — SIGNIFICANT CHANGE UP
NRBC # BLD: 0 /100 WBCS — SIGNIFICANT CHANGE UP (ref 0–0)
PH UR: 6 — SIGNIFICANT CHANGE UP (ref 5–8)
PLATELET # BLD AUTO: 241 K/UL — SIGNIFICANT CHANGE UP (ref 150–400)
POTASSIUM SERPL-MCNC: 4.1 MMOL/L — SIGNIFICANT CHANGE UP (ref 3.5–5.3)
POTASSIUM SERPL-SCNC: 4.1 MMOL/L — SIGNIFICANT CHANGE UP (ref 3.5–5.3)
PROT SERPL-MCNC: 6.6 G/DL — SIGNIFICANT CHANGE UP (ref 6–8.3)
PROT UR-MCNC: NEGATIVE MG/DL — SIGNIFICANT CHANGE UP
RBC # BLD: 3.95 M/UL — SIGNIFICANT CHANGE UP (ref 3.8–5.2)
RBC # FLD: 11.7 % — SIGNIFICANT CHANGE UP (ref 10.3–14.5)
SARS-COV-2 RNA SPEC QL NAA+PROBE: NEGATIVE — SIGNIFICANT CHANGE UP
SODIUM SERPL-SCNC: 133 MMOL/L — LOW (ref 135–145)
SP GR SPEC: 1.02 — SIGNIFICANT CHANGE UP (ref 1–1.03)
UROBILINOGEN FLD QL: 0.2 E.U./DL — SIGNIFICANT CHANGE UP
WBC # BLD: 9.5 K/UL — SIGNIFICANT CHANGE UP (ref 3.8–10.5)
WBC # FLD AUTO: 9.5 K/UL — SIGNIFICANT CHANGE UP (ref 3.8–10.5)

## 2022-06-02 PROCEDURE — 83690 ASSAY OF LIPASE: CPT

## 2022-06-02 PROCEDURE — 81003 URINALYSIS AUTO W/O SCOPE: CPT

## 2022-06-02 PROCEDURE — 96374 THER/PROPH/DIAG INJ IV PUSH: CPT

## 2022-06-02 PROCEDURE — 85025 COMPLETE CBC W/AUTO DIFF WBC: CPT

## 2022-06-02 PROCEDURE — 84702 CHORIONIC GONADOTROPIN TEST: CPT

## 2022-06-02 PROCEDURE — 80053 COMPREHEN METABOLIC PANEL: CPT

## 2022-06-02 PROCEDURE — 87635 SARS-COV-2 COVID-19 AMP PRB: CPT

## 2022-06-02 PROCEDURE — 99285 EMERGENCY DEPT VISIT HI MDM: CPT | Mod: 25

## 2022-06-02 PROCEDURE — 82010 KETONE BODYS QUAN: CPT

## 2022-06-02 PROCEDURE — 36415 COLL VENOUS BLD VENIPUNCTURE: CPT

## 2022-06-02 RX ORDER — ONDANSETRON 8 MG/1
1 TABLET, FILM COATED ORAL
Qty: 1 | Refills: 0
Start: 2022-06-02 | End: 2022-06-05

## 2022-06-02 RX ORDER — ONDANSETRON 8 MG/1
4 TABLET, FILM COATED ORAL ONCE
Refills: 0 | Status: COMPLETED | OUTPATIENT
Start: 2022-06-02 | End: 2022-06-02

## 2022-06-02 RX ORDER — SODIUM CHLORIDE 9 MG/ML
1000 INJECTION, SOLUTION INTRAVENOUS
Refills: 0 | Status: DISCONTINUED | OUTPATIENT
Start: 2022-06-02 | End: 2022-06-05

## 2022-06-02 RX ADMIN — ONDANSETRON 4 MILLIGRAM(S): 8 TABLET, FILM COATED ORAL at 02:38

## 2022-06-02 RX ADMIN — SODIUM CHLORIDE 200 MILLILITER(S): 9 INJECTION, SOLUTION INTRAVENOUS at 00:48

## 2022-06-02 RX ADMIN — ONDANSETRON 4 MILLIGRAM(S): 8 TABLET, FILM COATED ORAL at 00:04

## 2022-06-02 NOTE — CONSULT NOTE ADULT - ASSESSMENT
32yo  at 8w5d w/ IVF pregnancy presents with N/V 32yo  at 8w5d w/ IVF pregnancy presents with N/V.  -  30yo  at 8w5d w/ IVF pregnancy presents with N/V.  - patient with VSS, electrolytes grossly normal, and able to tolerate PO after IVF and zofran  - fetal status reassuring on bedside sono.  - patient will f/u with her OBGYN this Friday  - safe for d/c from GYn standpoint, patient has a prescription for zofran prn from ED attending.  - d/w Dr. Martinez

## 2022-06-02 NOTE — ED ADULT NURSE REASSESSMENT NOTE - NS ED NURSE REASSESS COMMENT FT1
OB/GYN at bedside for consult. IVFs running. See EMAR. Blood and urine samples collected and sent to lab.

## 2022-06-02 NOTE — CONSULT NOTE ADULT - SUBJECTIVE AND OBJECTIVE BOX
30yo  at 8w5d w/ IVF pregnancy presents with N/V. She reports that she developed nausea, with vomiting since . Today she had about 10 bouts of non bloody, sometimes bilious vomiting, the last episode being 2 hours ago. She has not been able to tolerate PO during that time. She also had diarrhea, about 3x today without blood. Her son is also experiencing vomiting and diarrhea. They returned from [place] on  where she had some cheese, veggies, and snap peas. Not currently taking any medications at home for nausea. Denies any current nausea after getting 4mg zofran in the ED, and is currently receiving IVF. Denies CP, SOB, palpitations, lightheadedness/dizziness, issues with voiding or ambulation. Denies ctx, lof, vb. Patient established care with [facility], first appointment scheduled for 6/3.    Obhx: 2020 pCS at term for NRFHT  Gynhx: denies  PMH: denies  PSH: 2020 pCS  meds: progesterone, estrace, PNV  all: nkda  social: denies    Vital Signs Last 24 Hrs  T(C): 37.1 (2022 22:43), Max: 37.1 (2022 22:43)  T(F): 98.7 (2022 22:43), Max: 98.7 (2022 22:43)  HR: 87 (2022 22:43) (87 - 87)  BP: 107/71 (2022 22:43) (107/71 - 107/71)  RR: 16 (2022 22:43) (16 - 16)  SpO2: 97% (2022 22:43) (97% - 97%)    Physical Exam:  Gen: NAD, comfortable  GI: soft, nontender, nondistended + BS, no rebound no guarding  Ext: no edema, erythema, tenderness     LABS:                        12.8   9.50  )-----------( 241      ( 2022 00:14 )             35.3             Urinalysis Basic - ( 2022 00:14 )    Color: Yellow / Appearance: Clear / S.025 / pH: x  Gluc: x / Ketone: 15 mg/dL  / Bili: Negative / Urobili: 0.2 E.U./dL   Blood: x / Protein: NEGATIVE mg/dL / Nitrite: NEGATIVE   Leuk Esterase: NEGATIVE / RBC: x / WBC x   Sq Epi: x / Non Sq Epi: x / Bacteria: x        RADIOLOGY & ADDITIONAL STUDIES:    bedside abd US: viable intrauterine pregnancy,  bpm

## 2022-06-28 ENCOUNTER — ASOB RESULT (OUTPATIENT)
Age: 32
End: 2022-06-28

## 2022-06-28 ENCOUNTER — APPOINTMENT (OUTPATIENT)
Dept: ANTEPARTUM | Facility: CLINIC | Age: 32
End: 2022-06-28

## 2022-06-28 PROCEDURE — 76801 OB US < 14 WKS SINGLE FETUS: CPT | Mod: 59

## 2022-06-28 PROCEDURE — 76813 OB US NUCHAL MEAS 1 GEST: CPT

## 2022-08-24 ENCOUNTER — ASOB RESULT (OUTPATIENT)
Age: 32
End: 2022-08-24

## 2022-08-24 ENCOUNTER — APPOINTMENT (OUTPATIENT)
Dept: ANTEPARTUM | Facility: CLINIC | Age: 32
End: 2022-08-24

## 2022-08-24 PROCEDURE — 76811 OB US DETAILED SNGL FETUS: CPT

## 2022-12-25 ENCOUNTER — INPATIENT (INPATIENT)
Facility: HOSPITAL | Age: 32
LOS: 1 days | Discharge: HOME | End: 2022-12-27
Attending: STUDENT IN AN ORGANIZED HEALTH CARE EDUCATION/TRAINING PROGRAM | Admitting: STUDENT IN AN ORGANIZED HEALTH CARE EDUCATION/TRAINING PROGRAM
Payer: COMMERCIAL

## 2022-12-25 VITALS — HEIGHT: 63 IN | WEIGHT: 160.06 LBS

## 2022-12-25 DIAGNOSIS — Z3A.00 WEEKS OF GESTATION OF PREGNANCY NOT SPECIFIED: ICD-10-CM

## 2022-12-25 DIAGNOSIS — O26.899 OTHER SPECIFIED PREGNANCY RELATED CONDITIONS, UNSPECIFIED TRIMESTER: ICD-10-CM

## 2022-12-25 LAB
BASOPHILS # BLD AUTO: 0.03 K/UL — SIGNIFICANT CHANGE UP (ref 0–0.2)
BASOPHILS NFR BLD AUTO: 0.3 % — SIGNIFICANT CHANGE UP (ref 0–2)
BLD GP AB SCN SERPL QL: NEGATIVE — SIGNIFICANT CHANGE UP
COVID-19 SPIKE DOMAIN AB INTERP: POSITIVE
COVID-19 SPIKE DOMAIN ANTIBODY RESULT: >250 U/ML — HIGH
EOSINOPHIL # BLD AUTO: 0.08 K/UL — SIGNIFICANT CHANGE UP (ref 0–0.5)
EOSINOPHIL NFR BLD AUTO: 0.7 % — SIGNIFICANT CHANGE UP (ref 0–6)
HCT VFR BLD CALC: 35.5 % — SIGNIFICANT CHANGE UP (ref 34.5–45)
HGB BLD-MCNC: 12.5 G/DL — SIGNIFICANT CHANGE UP (ref 11.5–15.5)
IMM GRANULOCYTES NFR BLD AUTO: 0.5 % — SIGNIFICANT CHANGE UP (ref 0–0.9)
LYMPHOCYTES # BLD AUTO: 1.58 K/UL — SIGNIFICANT CHANGE UP (ref 1–3.3)
LYMPHOCYTES # BLD AUTO: 14.2 % — SIGNIFICANT CHANGE UP (ref 13–44)
MCHC RBC-ENTMCNC: 31.7 PG — SIGNIFICANT CHANGE UP (ref 27–34)
MCHC RBC-ENTMCNC: 35.2 GM/DL — SIGNIFICANT CHANGE UP (ref 32–36)
MCV RBC AUTO: 90.1 FL — SIGNIFICANT CHANGE UP (ref 80–100)
MONOCYTES # BLD AUTO: 0.55 K/UL — SIGNIFICANT CHANGE UP (ref 0–0.9)
MONOCYTES NFR BLD AUTO: 4.9 % — SIGNIFICANT CHANGE UP (ref 2–14)
NEUTROPHILS # BLD AUTO: 8.84 K/UL — HIGH (ref 1.8–7.4)
NEUTROPHILS NFR BLD AUTO: 79.4 % — HIGH (ref 43–77)
NRBC # BLD: 0 /100 WBCS — SIGNIFICANT CHANGE UP (ref 0–0)
PLATELET # BLD AUTO: 154 K/UL — SIGNIFICANT CHANGE UP (ref 150–400)
RBC # BLD: 3.94 M/UL — SIGNIFICANT CHANGE UP (ref 3.8–5.2)
RBC # FLD: 12.8 % — SIGNIFICANT CHANGE UP (ref 10.3–14.5)
RH IG SCN BLD-IMP: POSITIVE — SIGNIFICANT CHANGE UP
SARS-COV-2 IGG+IGM SERPL QL IA: >250 U/ML — HIGH
SARS-COV-2 IGG+IGM SERPL QL IA: POSITIVE
SARS-COV-2 RNA SPEC QL NAA+PROBE: NEGATIVE — SIGNIFICANT CHANGE UP
WBC # BLD: 11.14 K/UL — HIGH (ref 3.8–10.5)
WBC # FLD AUTO: 11.14 K/UL — HIGH (ref 3.8–10.5)

## 2022-12-25 RX ORDER — ACETAMINOPHEN 500 MG
975 TABLET ORAL
Refills: 0 | Status: DISCONTINUED | OUTPATIENT
Start: 2022-12-25 | End: 2022-12-27

## 2022-12-25 RX ORDER — DIPHENHYDRAMINE HCL 50 MG
25 CAPSULE ORAL EVERY 6 HOURS
Refills: 0 | Status: DISCONTINUED | OUTPATIENT
Start: 2022-12-25 | End: 2022-12-27

## 2022-12-25 RX ORDER — FENTANYL/BUPIVACAINE/NS/PF 2MCG/ML-.1
250 PLASTIC BAG, INJECTION (ML) INJECTION
Refills: 0 | Status: DISCONTINUED | OUTPATIENT
Start: 2022-12-25 | End: 2022-12-25

## 2022-12-25 RX ORDER — OXYCODONE HYDROCHLORIDE 5 MG/1
5 TABLET ORAL
Refills: 0 | Status: DISCONTINUED | OUTPATIENT
Start: 2022-12-25 | End: 2022-12-27

## 2022-12-25 RX ORDER — CITRIC ACID/SODIUM CITRATE 300-500 MG
15 SOLUTION, ORAL ORAL ONCE
Refills: 0 | Status: DISCONTINUED | OUTPATIENT
Start: 2022-12-25 | End: 2022-12-25

## 2022-12-25 RX ORDER — OXYTOCIN 10 UNIT/ML
333.33 VIAL (ML) INJECTION
Qty: 20 | Refills: 0 | Status: DISCONTINUED | OUTPATIENT
Start: 2022-12-25 | End: 2022-12-25

## 2022-12-25 RX ORDER — IBUPROFEN 200 MG
600 TABLET ORAL EVERY 6 HOURS
Refills: 0 | Status: COMPLETED | OUTPATIENT
Start: 2022-12-25 | End: 2023-11-23

## 2022-12-25 RX ORDER — CHLORHEXIDINE GLUCONATE 213 G/1000ML
1 SOLUTION TOPICAL ONCE
Refills: 0 | Status: DISCONTINUED | OUTPATIENT
Start: 2022-12-25 | End: 2022-12-25

## 2022-12-25 RX ORDER — OXYTOCIN 10 UNIT/ML
2 VIAL (ML) INJECTION
Qty: 30 | Refills: 0 | Status: DISCONTINUED | OUTPATIENT
Start: 2022-12-25 | End: 2022-12-27

## 2022-12-25 RX ORDER — OXYCODONE HYDROCHLORIDE 5 MG/1
5 TABLET ORAL ONCE
Refills: 0 | Status: DISCONTINUED | OUTPATIENT
Start: 2022-12-25 | End: 2022-12-27

## 2022-12-25 RX ORDER — HYDROCORTISONE 1 %
1 OINTMENT (GRAM) TOPICAL EVERY 6 HOURS
Refills: 0 | Status: DISCONTINUED | OUTPATIENT
Start: 2022-12-25 | End: 2022-12-27

## 2022-12-25 RX ORDER — IBUPROFEN 200 MG
600 TABLET ORAL EVERY 6 HOURS
Refills: 0 | Status: DISCONTINUED | OUTPATIENT
Start: 2022-12-25 | End: 2022-12-27

## 2022-12-25 RX ORDER — AER TRAVELER 0.5 G/1
1 SOLUTION RECTAL; TOPICAL EVERY 4 HOURS
Refills: 0 | Status: DISCONTINUED | OUTPATIENT
Start: 2022-12-25 | End: 2022-12-27

## 2022-12-25 RX ORDER — TETANUS TOXOID, REDUCED DIPHTHERIA TOXOID AND ACELLULAR PERTUSSIS VACCINE, ADSORBED 5; 2.5; 8; 8; 2.5 [IU]/.5ML; [IU]/.5ML; UG/.5ML; UG/.5ML; UG/.5ML
0.5 SUSPENSION INTRAMUSCULAR ONCE
Refills: 0 | Status: DISCONTINUED | OUTPATIENT
Start: 2022-12-25 | End: 2022-12-27

## 2022-12-25 RX ORDER — MAGNESIUM HYDROXIDE 400 MG/1
30 TABLET, CHEWABLE ORAL
Refills: 0 | Status: DISCONTINUED | OUTPATIENT
Start: 2022-12-25 | End: 2022-12-27

## 2022-12-25 RX ORDER — KETOROLAC TROMETHAMINE 30 MG/ML
30 SYRINGE (ML) INJECTION ONCE
Refills: 0 | Status: DISCONTINUED | OUTPATIENT
Start: 2022-12-25 | End: 2022-12-25

## 2022-12-25 RX ORDER — AMPICILLIN TRIHYDRATE 250 MG
1000 CAPSULE ORAL ONCE
Refills: 0 | Status: COMPLETED | OUTPATIENT
Start: 2022-12-25 | End: 2022-12-25

## 2022-12-25 RX ORDER — SODIUM CHLORIDE 9 MG/ML
1000 INJECTION, SOLUTION INTRAVENOUS
Refills: 0 | Status: DISCONTINUED | OUTPATIENT
Start: 2022-12-25 | End: 2022-12-25

## 2022-12-25 RX ORDER — AMPICILLIN TRIHYDRATE 250 MG
1 CAPSULE ORAL ONCE
Refills: 0 | Status: DISCONTINUED | OUTPATIENT
Start: 2022-12-25 | End: 2022-12-25

## 2022-12-25 RX ORDER — AMPICILLIN TRIHYDRATE 250 MG
2 CAPSULE ORAL ONCE
Refills: 0 | Status: COMPLETED | OUTPATIENT
Start: 2022-12-25 | End: 2022-12-25

## 2022-12-25 RX ORDER — SIMETHICONE 80 MG/1
80 TABLET, CHEWABLE ORAL EVERY 4 HOURS
Refills: 0 | Status: DISCONTINUED | OUTPATIENT
Start: 2022-12-25 | End: 2022-12-27

## 2022-12-25 RX ORDER — BENZOCAINE 10 %
1 GEL (GRAM) MUCOUS MEMBRANE EVERY 6 HOURS
Refills: 0 | Status: DISCONTINUED | OUTPATIENT
Start: 2022-12-25 | End: 2022-12-27

## 2022-12-25 RX ORDER — DIBUCAINE 1 %
1 OINTMENT (GRAM) RECTAL EVERY 6 HOURS
Refills: 0 | Status: DISCONTINUED | OUTPATIENT
Start: 2022-12-25 | End: 2022-12-27

## 2022-12-25 RX ORDER — SODIUM CHLORIDE 9 MG/ML
3 INJECTION INTRAMUSCULAR; INTRAVENOUS; SUBCUTANEOUS EVERY 8 HOURS
Refills: 0 | Status: DISCONTINUED | OUTPATIENT
Start: 2022-12-25 | End: 2022-12-27

## 2022-12-25 RX ORDER — OXYTOCIN 10 UNIT/ML
41.67 VIAL (ML) INJECTION
Qty: 20 | Refills: 0 | Status: DISCONTINUED | OUTPATIENT
Start: 2022-12-25 | End: 2022-12-27

## 2022-12-25 RX ORDER — PRAMOXINE HYDROCHLORIDE 150 MG/15G
1 AEROSOL, FOAM RECTAL EVERY 4 HOURS
Refills: 0 | Status: DISCONTINUED | OUTPATIENT
Start: 2022-12-25 | End: 2022-12-27

## 2022-12-25 RX ORDER — LANOLIN
1 OINTMENT (GRAM) TOPICAL EVERY 6 HOURS
Refills: 0 | Status: DISCONTINUED | OUTPATIENT
Start: 2022-12-25 | End: 2022-12-27

## 2022-12-25 RX ADMIN — Medication 975 MILLIGRAM(S): at 21:07

## 2022-12-25 RX ADMIN — Medication 216 GRAM(S): at 07:17

## 2022-12-25 RX ADMIN — Medication 1 APPLICATION(S): at 19:05

## 2022-12-25 RX ADMIN — Medication 975 MILLIGRAM(S): at 22:07

## 2022-12-25 RX ADMIN — SODIUM CHLORIDE 3 MILLILITER(S): 9 INJECTION INTRAMUSCULAR; INTRAVENOUS; SUBCUTANEOUS at 22:00

## 2022-12-25 RX ADMIN — Medication 208 MILLIGRAM(S): at 11:40

## 2022-12-25 RX ADMIN — Medication 600 MILLIGRAM(S): at 19:32

## 2022-12-25 RX ADMIN — Medication 125 MILLIUNIT(S)/MIN: at 12:42

## 2022-12-25 RX ADMIN — Medication 1 SPRAY(S): at 19:04

## 2022-12-25 RX ADMIN — AER TRAVELER 1 APPLICATION(S): 0.5 SOLUTION RECTAL; TOPICAL at 19:04

## 2022-12-25 RX ADMIN — Medication 30 MILLIGRAM(S): at 14:06

## 2022-12-25 NOTE — PATIENT PROFILE OB - FUNCTIONAL ASSESSMENT - BASIC MOBILITY 6.
4-calculated by average/Not able to assess (calculate score using [facility] averaging method)

## 2022-12-25 NOTE — PATIENT PROFILE OB - FALL HARM RISK - UNIVERSAL INTERVENTIONS
Bed in lowest position, wheels locked, appropriate side rails in place/Call bell, personal items and telephone in reach/Instruct patient to call for assistance before getting out of bed or chair/Non-slip footwear when patient is out of bed/[place] to call system/Physically safe environment - no spills, clutter or unnecessary equipment/Purposeful Proactive Rounding/Room/bathroom lighting operational, light cord in reach

## 2022-12-26 LAB — T PALLIDUM AB TITR SER: NEGATIVE — SIGNIFICANT CHANGE UP

## 2022-12-26 RX ADMIN — Medication 975 MILLIGRAM(S): at 21:35

## 2022-12-26 RX ADMIN — Medication 600 MILLIGRAM(S): at 11:13

## 2022-12-26 RX ADMIN — Medication 600 MILLIGRAM(S): at 18:08

## 2022-12-26 RX ADMIN — Medication 975 MILLIGRAM(S): at 09:37

## 2022-12-26 RX ADMIN — PRAMOXINE HYDROCHLORIDE 1 APPLICATION(S): 150 AEROSOL, FOAM RECTAL at 21:06

## 2022-12-26 RX ADMIN — Medication 1 APPLICATION(S): at 21:06

## 2022-12-26 RX ADMIN — Medication 600 MILLIGRAM(S): at 05:21

## 2022-12-26 RX ADMIN — Medication 975 MILLIGRAM(S): at 21:05

## 2022-12-26 RX ADMIN — Medication 975 MILLIGRAM(S): at 15:08

## 2022-12-26 RX ADMIN — Medication 1 TABLET(S): at 11:13

## 2022-12-26 NOTE — LACTATION INITIAL EVALUATION - LACTATION INTERVENTIONS
initiate/review safe skin-to-skin/initiate/review hand expression/initiate/review techniques for position and latch/reviewed components of an effective feeding and at least 8 effective feedings per day required/reviewed importance of monitoring infant diapers, the breastfeeding log, and minimum output each day/reviewed benefits and recommendations for rooming in/reviewed feeding on demand/by cue at least 8 times a day/reviewed indications of inadequate milk transfer that would require supplementation

## 2022-12-26 NOTE — LACTATION INITIAL EVALUATION - NS LACT CON REASON FOR REQ
38.1 wk gestation baby, about 22 hrs old at this time. Placed the baby STS with the mother while I provided breastfeeding education and explained normal  behaviour and the milk production feedback system. Assisted with positioning in a football hold and taught latch strategies. Baby was able to latch deeply and is feeding well, rhythmically sucking between short pauses of rest. Mother to continue with STS when possible, room-in, and feed as per cues at least 8-12x/ day. To f/u as needed./multiparous mom/staff request/patient request

## 2022-12-27 ENCOUNTER — TRANSCRIPTION ENCOUNTER (OUTPATIENT)
Age: 32
End: 2022-12-27

## 2022-12-27 VITALS
TEMPERATURE: 98 F | HEART RATE: 81 BPM | OXYGEN SATURATION: 97 % | SYSTOLIC BLOOD PRESSURE: 106 MMHG | RESPIRATION RATE: 18 BRPM | DIASTOLIC BLOOD PRESSURE: 68 MMHG

## 2022-12-27 PROCEDURE — 99214 OFFICE O/P EST MOD 30 MIN: CPT

## 2022-12-27 PROCEDURE — 86780 TREPONEMA PALLIDUM: CPT

## 2022-12-27 PROCEDURE — 87635 SARS-COV-2 COVID-19 AMP PRB: CPT

## 2022-12-27 PROCEDURE — 86900 BLOOD TYPING SEROLOGIC ABO: CPT

## 2022-12-27 PROCEDURE — 86901 BLOOD TYPING SEROLOGIC RH(D): CPT

## 2022-12-27 PROCEDURE — 86850 RBC ANTIBODY SCREEN: CPT

## 2022-12-27 PROCEDURE — 36415 COLL VENOUS BLD VENIPUNCTURE: CPT

## 2022-12-27 PROCEDURE — 85025 COMPLETE CBC W/AUTO DIFF WBC: CPT

## 2022-12-27 PROCEDURE — 59050 FETAL MONITOR W/REPORT: CPT

## 2022-12-27 PROCEDURE — 86769 SARS-COV-2 COVID-19 ANTIBODY: CPT

## 2022-12-27 RX ADMIN — Medication 600 MILLIGRAM(S): at 08:13

## 2022-12-27 RX ADMIN — Medication 1 APPLICATION(S): at 09:04

## 2022-12-27 RX ADMIN — Medication 600 MILLIGRAM(S): at 07:43

## 2022-12-27 RX ADMIN — MAGNESIUM HYDROXIDE 30 MILLILITER(S): 400 TABLET, CHEWABLE ORAL at 12:11

## 2022-12-27 RX ADMIN — Medication 1 TABLET(S): at 12:10

## 2022-12-27 RX ADMIN — Medication 975 MILLIGRAM(S): at 03:23

## 2022-12-27 RX ADMIN — Medication 600 MILLIGRAM(S): at 00:26

## 2022-12-27 RX ADMIN — Medication 975 MILLIGRAM(S): at 04:28

## 2022-12-27 RX ADMIN — Medication 600 MILLIGRAM(S): at 00:56

## 2022-12-27 RX ADMIN — Medication 975 MILLIGRAM(S): at 09:04

## 2022-12-27 RX ADMIN — Medication 600 MILLIGRAM(S): at 12:10

## 2022-12-27 NOTE — PROGRESS NOTE ADULT - ASSESSMENT
A/P 32y s/p BVBAC, PPD#2 , stable, meeting postpartum milestones   - Pain: well controlled on tylenol/motrin  - GI: Tolerating regular diet  - : urinating without difficulty/pain  - DVT prophylaxis: ambulating frequently  - Dispo: PPD 2, unless otherwise specified    
A/P 32y s/p VAVD, PPD#1 , stable, meeting postpartum milestones   - Pain: well controlled on tylenol/motrin  - GI: Tolerating regular diet  - : urinating without difficulty/pain  - DVT prophylaxis: ambulating frequently  - Dispo: PPD 2, unless otherwise specified

## 2022-12-27 NOTE — DISCHARGE NOTE OB - NS OB DC MMR REASON NOT RECEIVED
PRE-SEDATION ASSESSMENT  PROCEDURE   Procedure: COLONOSCOPY DIAGNOSTIC [95190] (COLONOSCOPY)  Indications: Screening for Colon CA    CONSENT  Risks, benefits, and alternatives have been discussed with the patient/patient representative, and patient/patient representative agrees to proceed: Yes  The risks of the procedure including the possibility of bleeding, perforation, infection, and complications of sedation were discussed with the patient who accepts these risks.    MEDICAL HISTORY  Significant medical/surgical history: Yes (Hypertension, Chemotherapy-uinduced neutropenia, GERD, Hx of Left Breast CA, Microalbuminuria, Hyperlipidemia, Multinodular Goiter, Diabetes Mellitus Type II, Sickle Cell trait, PONV (Postoperative Nausea and Vomiting))  Past Complications with Sedation/Anesthesia: Yes (PONV (Postoperative Nausea and Vomiting))  Significant Family History: Yes (Sickle Cell Anemia (Mother), Hypertension, Thyroid Issue (Sister), ASCAD/MI/CHF (Maternal Grandfather))  Smoking History: No  Alcohol/Drug abuse: No  Possible Pregnancy (LMP): No  Cardiac History: No  Respiratory History: No    PHYSICAL EXAM  History and Physical Reviewed: H&P completed today  Airway Risk History: No previous history (Per Anesthesiologist)  Airway Anatomy :  (Per Anesthesiologist)  Heart : Normal  Lungs : Normal  LOC/Mental Status : Normal    OTHER FINDINGS  Reviewed current medications and allergies: Yes  Pertinent lab/diagnostic test reviewed: Yes    SEDATION RISK ASSESSMENT  Risk Status ASA:  (Per Anesthesiologist)  Plan for Sedation: Deep Sedation (MAC)  Indications for Procedure/Pre-Procedure Diagnosis and Planned Procedure: Screening for Colon CA  EKG Monitoring: Yes    NARRATIVE FINDINGS  Narrative Findings: None   Contraindicated

## 2022-12-27 NOTE — DISCHARGE NOTE OB - CARE PROVIDER_API CALL
Rakel Rowell)  Obstetrics and Gynecology  [address] [place] [address]  Phone: [phone]  Fax: [phone]  Follow Up Time:

## 2022-12-27 NOTE — PROGRESS NOTE ADULT - SUBJECTIVE AND OBJECTIVE BOX
Patient evaluated at bedside this morning, resting comfortable in bed, no acute events overnight.  She reports pain is well controlled with tylenol and motrin.  She denies headache, dizziness, chest pain, palpitations, shortness of breath, nausea, vomiting, fever, chills, heavy vaginal bleeding. She has been ambulating without assistance, voiding spontaneously.  Tolerating food well, without nausea/vomit.      Physical Exam:  T(C): 36.5 (12-26-22 @ 05:40), Max: 36.8 (12-25-22 @ 22:29)  HR: 70 (12-26-22 @ 05:40) (65 - 70)  BP: 108/74 (12-26-22 @ 05:40) (101/65 - 108/74)  RR: 17 (12-26-22 @ 05:40) (17 - 18)  SpO2: 97% (12-26-22 @ 05:40) (96% - 97%)    GA: NAD, A&O x 3  Pulm: no increased work of breathing  Abd: soft, nontender, nondistended, no rebound or guarding, uterus firm.  Extremities: no swelling or calf tenderness                          12.5   11.14 )-----------( 154      ( 25 Dec 2022 05:33 )             35.5           acetaminophen     Tablet .. 975 milliGRAM(s) Oral <User Schedule>  benzocaine 20%/menthol 0.5% Spray 1 Spray(s) Topical every 6 hours PRN  dibucaine 1% Ointment 1 Application(s) Topical every 6 hours PRN  diphenhydrAMINE 25 milliGRAM(s) Oral every 6 hours PRN  diphtheria/tetanus/pertussis (acellular) Vaccine (Adacel) 0.5 milliLiter(s) IntraMuscular once  fentanyl (2 MICROgram(s)/mL) + bupivacaine 0.0625%  in 0.9% Sodium Chloride PCEA 250 milliLiter(s) Epidural PCA Continuous  hydrocortisone 1% Cream 1 Application(s) Topical every 6 hours PRN  ibuprofen  Tablet. 600 milliGRAM(s) Oral every 6 hours  lanolin Ointment 1 Application(s) Topical every 6 hours PRN  magnesium hydroxide Suspension 30 milliLiter(s) Oral two times a day PRN  oxyCODONE    IR 5 milliGRAM(s) Oral every 3 hours PRN  oxyCODONE    IR 5 milliGRAM(s) Oral once PRN  oxytocin Infusion 41.667 milliUNIT(s)/Min IV Continuous <Continuous>  oxytocin Infusion. 2 milliUNIT(s)/Min IV Continuous <Continuous>  pramoxine 1%/zinc 5% Cream 1 Application(s) Topical every 4 hours PRN  prenatal multivitamin 1 Tablet(s) Oral daily  simethicone 80 milliGRAM(s) Chew every 4 hours PRN  sodium chloride 0.9% lock flush 3 milliLiter(s) IV Push every 8 hours  witch hazel Pads 1 Application(s) Topical every 4 hours PRN  
Patient evaluated at bedside this morning, resting comfortable in bed, sleeping, no acute events overnight.      Physical Exam:  T(C): 36.6 (12-27-22 @ 06:04), Max: 36.6 (12-27-22 @ 06:04)  HR: 77 (12-27-22 @ 06:04) (77 - 77)  BP: 96/59 (12-27-22 @ 06:04) (96/59 - 96/59)  RR: 18 (12-27-22 @ 06:04) (18 - 18)  SpO2: 96% (12-27-22 @ 06:04) (96% - 96%)          acetaminophen     Tablet .. 975 milliGRAM(s) Oral <User Schedule>  benzocaine 20%/menthol 0.5% Spray 1 Spray(s) Topical every 6 hours PRN  dibucaine 1% Ointment 1 Application(s) Topical every 6 hours PRN  diphenhydrAMINE 25 milliGRAM(s) Oral every 6 hours PRN  diphtheria/tetanus/pertussis (acellular) Vaccine (Adacel) 0.5 milliLiter(s) IntraMuscular once  fentanyl (2 MICROgram(s)/mL) + bupivacaine 0.0625%  in 0.9% Sodium Chloride PCEA 250 milliLiter(s) Epidural PCA Continuous  hydrocortisone 1% Cream 1 Application(s) Topical every 6 hours PRN  ibuprofen  Tablet. 600 milliGRAM(s) Oral every 6 hours  lanolin Ointment 1 Application(s) Topical every 6 hours PRN  magnesium hydroxide Suspension 30 milliLiter(s) Oral two times a day PRN  oxyCODONE    IR 5 milliGRAM(s) Oral every 3 hours PRN  oxyCODONE    IR 5 milliGRAM(s) Oral once PRN  oxytocin Infusion 41.667 milliUNIT(s)/Min IV Continuous <Continuous>  oxytocin Infusion. 2 milliUNIT(s)/Min IV Continuous <Continuous>  pramoxine 1%/zinc 5% Cream 1 Application(s) Topical every 4 hours PRN  prenatal multivitamin 1 Tablet(s) Oral daily  simethicone 80 milliGRAM(s) Chew every 4 hours PRN  sodium chloride 0.9% lock flush 3 milliLiter(s) IV Push every 8 hours  witch hazel Pads 1 Application(s) Topical every 4 hours PRN

## 2022-12-27 NOTE — DISCHARGE NOTE OB - CARE PLAN
1 Principal Discharge DX:	Vaginal birth after  delivery  Assessment and plan of treatment:	Vaginal delivery, meeting all postpartum milestones.  Please follow-up with your OB doctor within 6 weeks.  You can resume a regular diet at home and may continue your prenatal vitamins as directed.  Please place nothing in the vagina for 6 weeks (no tampons, sex, douching, tub baths, swimming pools, etc).  If you have severe headaches and/or vision changes, heavy bleeding, or chest pain, please call your provider or go to the nearest Emergency Department.  Please call your OB with any signs of symptoms of infection including fever > 100.4 degrees, severe pain, malodorous vaginal discharge or heavy bleeding requiring more than 1-2 pads/hour.  You can take Motrin 600mg orally every 6 hours for pain as needed.

## 2022-12-27 NOTE — DISCHARGE NOTE OB - HOSPITAL COURSE
Patient is status post a vaginal delivery after  section. She had an uncomplicated postpartum course and has met her postpartum milestones appropriately.  Vitals are stable for discharge.

## 2022-12-27 NOTE — DISCHARGE NOTE OB - PATIENT PORTAL LINK FT
You can access the FollowMyHealth Patient Portal offered by [facility] by registering at the following website: [email or web address]. By joining [employer]’s FollowMyHealth portal, you will also be able to view your health information using other applications (apps) compatible with our system.

## 2022-12-31 DIAGNOSIS — Z34.83 ENCOUNTER FOR SUPERVISION OF OTHER NORMAL PREGNANCY, THIRD TRIMESTER: ICD-10-CM

## 2022-12-31 DIAGNOSIS — Z3A.38 38 WEEKS GESTATION OF PREGNANCY: ICD-10-CM

## 2022-12-31 DIAGNOSIS — O34.219 MATERNAL CARE FOR UNSPECIFIED TYPE SCAR FROM PREVIOUS CESAREAN DELIVERY: ICD-10-CM

## 2023-03-31 NOTE — PRE-ANESTHESIA EVALUATION ADULT - NSANTHALCOHOLSD_GEN_ALL_CORE
----- Message from Catina Mcgrath MD sent at 3/29/2023  9:57 PM CDT -----  The Labs are improved; need prograf level - please let patient know.  
Lab letter sent to patient and Lab Orders faxed to [facility] to include(CBC/CMP/ Prograf level).   
No

## 2023-06-08 NOTE — DISCHARGE NOTE OB - NS MD DC FALL RISK RISK
Detail Level: Detailed
For information on Fall & Injury Prevention, visit: [email or web address] OR  [email or web address] OR  https://www.cdc.gov/steadi/patient.html

## 2023-10-26 ENCOUNTER — APPOINTMENT (OUTPATIENT)
Dept: HUMAN REPRODUCTION | Facility: CLINIC | Age: 33
End: 2023-10-26
Payer: COMMERCIAL

## 2023-10-26 ENCOUNTER — APPOINTMENT (OUTPATIENT)
Dept: HUMAN REPRODUCTION | Facility: CLINIC | Age: 33
End: 2023-10-26

## 2023-10-26 PROCEDURE — 99204 OFFICE O/P NEW MOD 45 MIN: CPT | Mod: 95

## 2024-07-31 ENCOUNTER — ASOB RESULT (OUTPATIENT)
Age: 34
End: 2024-07-31

## 2024-07-31 ENCOUNTER — APPOINTMENT (OUTPATIENT)
Dept: ANTEPARTUM | Facility: CLINIC | Age: 34
End: 2024-07-31
Payer: COMMERCIAL

## 2024-07-31 PROCEDURE — 76813 OB US NUCHAL MEAS 1 GEST: CPT

## 2024-07-31 PROCEDURE — 93976 VASCULAR STUDY: CPT

## 2024-10-02 ENCOUNTER — APPOINTMENT (OUTPATIENT)
Dept: ANTEPARTUM | Facility: CLINIC | Age: 34
End: 2024-10-02
Payer: COMMERCIAL

## 2024-10-02 ENCOUNTER — ASOB RESULT (OUTPATIENT)
Age: 34
End: 2024-10-02

## 2024-10-02 PROCEDURE — 76811 OB US DETAILED SNGL FETUS: CPT

## 2024-10-02 PROCEDURE — 76817 TRANSVAGINAL US OBSTETRIC: CPT

## 2024-11-04 ENCOUNTER — APPOINTMENT (OUTPATIENT)
Dept: ANTEPARTUM | Facility: CLINIC | Age: 34
End: 2024-11-04

## 2024-11-04 ENCOUNTER — ASOB RESULT (OUTPATIENT)
Age: 34
End: 2024-11-04

## 2024-12-16 ENCOUNTER — APPOINTMENT (OUTPATIENT)
Dept: ANTEPARTUM | Facility: CLINIC | Age: 34
End: 2024-12-16

## 2025-02-04 ENCOUNTER — OUTPATIENT (OUTPATIENT)
Dept: OUTPATIENT SERVICES | Facility: HOSPITAL | Age: 35
LOS: 1 days | End: 2025-02-04
Payer: COMMERCIAL

## 2025-02-04 ENCOUNTER — OUTPATIENT (OUTPATIENT)
Dept: OUTPATIENT SERVICES | Facility: HOSPITAL | Age: 35
LOS: 1 days | End: 2025-02-04

## 2025-02-04 VITALS
DIASTOLIC BLOOD PRESSURE: 64 MMHG | TEMPERATURE: 98 F | HEART RATE: 83 BPM | OXYGEN SATURATION: 98 % | SYSTOLIC BLOOD PRESSURE: 103 MMHG | RESPIRATION RATE: 18 BRPM

## 2025-02-04 VITALS
SYSTOLIC BLOOD PRESSURE: 115 MMHG | HEART RATE: 76 BPM | RESPIRATION RATE: 16 BRPM | DIASTOLIC BLOOD PRESSURE: 64 MMHG | TEMPERATURE: 98 F

## 2025-02-04 DIAGNOSIS — Z98.891 HISTORY OF UTERINE SCAR FROM PREVIOUS SURGERY: Chronic | ICD-10-CM

## 2025-02-04 DIAGNOSIS — O26.899 OTHER SPECIFIED PREGNANCY RELATED CONDITIONS, UNSPECIFIED TRIMESTER: ICD-10-CM

## 2025-02-04 PROCEDURE — 76818 FETAL BIOPHYS PROFILE W/NST: CPT

## 2025-02-04 PROCEDURE — 59025 FETAL NON-STRESS TEST: CPT | Mod: 26,59

## 2025-02-04 PROCEDURE — 99214 OFFICE O/P EST MOD 30 MIN: CPT

## 2025-02-04 PROCEDURE — 83986 ASSAY PH BODY FLUID NOS: CPT | Mod: QW

## 2025-02-04 PROCEDURE — 59025 FETAL NON-STRESS TEST: CPT

## 2025-02-04 PROCEDURE — 76818 FETAL BIOPHYS PROFILE W/NST: CPT | Mod: 26

## 2025-02-04 PROCEDURE — 83986 ASSAY PH BODY FLUID NOS: CPT

## 2025-02-04 PROCEDURE — 99212 OFFICE O/P EST SF 10 MIN: CPT | Mod: 25

## 2025-02-04 NOTE — OB PROVIDER TRIAGE NOTE - HISTORY OF PRESENT ILLNESS
34y F  @ 38w2d presenting for further evaluation of her MARLON. Patient was originally sent from Dr. Chauhan's office earlier today with concerns for oligohydramnios. Patient states that she had an MARLON of 2.0 cm in the office earlier today. Patient was instructed by her doctor to hydrate orally and come to triage for further monitoring. In triage, patient had a BPP 8/8 and an MARLON of 5.7 and was ruled out for SROM with negative Nitrazine negative pooling on speculum, and ferning negative. Of note, patient was robinson irregularly at the time and was 1.5cm dilated. Due to sufficient amniotic fluid, clinical decision that patient was not rupture or in labor, the patient was discharged home and told to come back to triage for re-evaluation. Patient endorses fetal movement and irregular contractions that are 2/10 in pain when they occur. She denies any leakage of fluid or vaginal bleeding at this time.     Ante: Spontaneous, uncomplicated pregnancy. NIPT and sonograms wnl GCT negative. GBS positive. EFW 2977g    ObHx:   G1 - C section for nonreassuring FHT (, ~3000g)  G2 -  at 38 weeks, uncomplicated (~3000g)  G3 - current    GYNHx: Denies fibroids/ovarian cysts/abnormal pap smears/STI's  PMHx: Patent foramen ovale - follows with cardiology  SurgHx: see OB hx. denies   Meds: PNV, ASA 81mg BID for PFO   Allergies: NKDA  No Known Allergies      PE  BP: 103/64  P: 83  General: NAD; Lying comfortably in bed  Pulm: No increased work of breathing noted.   Abdomen: Soft, nontender, gravid.   Extremities: No calf tenderness or swelling noted bilaterally.     SVE: 1.5/50/-3    NST: baseline 130bpm, moderate variability, +accels, no decels  [place]: irregular contractions   TAUS: cephalic, placenta posterior, BPP 8/8. MARLON 7.5cm.

## 2025-02-04 NOTE — OB PROVIDER TRIAGE NOTE - HISTORY OF PRESENT ILLNESS
HOMERO [id number]  S: 34y F  @ 38w2d presenting from Dr. Chauhan's office for a low MARLON of 3. Patient says Dr. Chauhan told her to hydrate and come to hospital for further monitoring and recheck of MARLON after oral hydration. Of note, patient had some tricking of fluid at noon today and at around 3pm, clear fluid. Patient is having some contractions in triage but not regular.  Reports +FM, Denies VB.    Ante: Spontaneous, uncomplicated pregnancy. NIPT and sonograms wnl GCT negative. GBS positive. EFW 2977g    ObHx: G1 - C section for nonreassuring FHT (,   GYNHx: Denies  PMHx: Denies  SurgHx: Denies  Meds: Denies  No Known Allergies      PE  T(C): --  HR: --  BP: --  RR: --  SpO2: --  General: NAD; Lying comfortably in bed  Pulm: No increased work of breathing noted.   Abdomen: Soft, nontender, gravid.   Extremities: No calf tenderness or swelling noted bilaterally.   SVE:   SSE:     NST:   [place]:   TAUS: *See copy of ultrasound in patient chart           HOMERO [id number]  S: 34y F  @ 38w2d presenting from Dr. Chauhan's office for a low MARLON of 3. Patient says Dr. Chauhan told her to hydrate and come to hospital for further monitoring and recheck of MARLON after oral hydration. Of note, patient had some tricking of fluid at noon today and at around 3pm, clear fluid. Patient is having some contractions in triage but not regular.  Reports +FM, Denies VB.    Ante: Spontaneous, uncomplicated pregnancy. NIPT and sonograms wnl GCT negative. GBS positive. EFW 2977g    ObHx: G1 - C section for nonreassuring FHT (, baby was between 2863-3175g)  G2 -  (2863-3175g)  G3 - current    GYNHx: Denies fibroids/ovarian cysts/abnormal pap smears/STI's  PMHx: Patent foramen ovale - follows with cardiology  SurgHx: Denies  Meds: PNV, ASA  No Known Allergies      PE  BP: 115/64 HR: 88  General: NAD; Lying comfortably in bed  Pulm: No increased work of breathing noted.   Abdomen: Soft, nontender, gravid.   Extremities: No calf tenderness or swelling noted bilaterally.   SVE: 1.5/posterior    NST: baseline 145bpm, moderate variability, +accels, no decels  [place]: irregular contractions noted  TAUS: cephalic, placenta posterior    A/P: 34y F  @ 38w2d presenting from Dr. Chauhan's office for a low MARLON of 3, rule out labor. Patient VE in office was 1.5, negative nitrizine. Patient s/p oral hydration, negative repeat nitrizine, negative pooling, repeat VE 1.5. BPP /, MARLON improved 5.6cm.  - Fetal status reassuring on NST and BPP  - plan to keep patient on monitor for another 20 mintutes to observe contraction pattern longer, at that time will determine whether patient is truly in early labor.    D/w Dr. Mcfadden, attending  Jeannette Helm PA-C  HOMERO [id number]  S: 34y F  @ 38w2d presenting from Dr. Chauhan's office for a low MARLON of 3. Patient says Dr. Chauhan told her to hydrate and come to hospital for further monitoring and recheck of MARLON after oral hydration. Of note, patient had some tricking of fluid at noon today and at around 3pm, clear fluid. Patient is having some contractions in triage but not regular.  Reports +FM, Denies VB.    Ante: Spontaneous, uncomplicated pregnancy. NIPT and sonograms wnl GCT negative. GBS positive. EFW 2977g    ObHx: G1 - C section for nonreassuring FHT (, baby was between 2863-3175g)  G2 -  (2863-3175g)  G3 - current    GYNHx: Denies fibroids/ovarian cysts/abnormal pap smears/STI's  PMHx: Patent foramen ovale - follows with cardiology  SurgHx: Denies  Meds: PNV, ASA  No Known Allergies      PE  BP: 115/64 HR: 88  General: NAD; Lying comfortably in bed  Pulm: No increased work of breathing noted.   Abdomen: Soft, nontender, gravid.   Extremities: No calf tenderness or swelling noted bilaterally.   SVE: 1.5/posterior    NST: baseline 145bpm, moderate variability, +accels, no decels  [place]: irregular contractions noted  TAUS: cephalic, placenta posterior, US performed, printer ran out of paper    A/P: 34y F  @ 38w2d presenting from Dr. Chauhan's office for a low MARLON of 3, rule out labor. Patient VE in office was 1.5, negative nitrizine. Patient s/p oral hydration, negative repeat nitrizine, negative pooling, repeat VE 1.5. BPP 8/8, MARLON improved 5.6cm.  - Fetal status reassuring on NST and BPP  - plan to keep patient on monitor for another 20 mintutes to observe contraction pattern longer, at that time will determine whether patient is truly in early labor.    D/w Dr. Mcfadden, attending  Jeannette Helm PA-C    Addendum:  Assessment: Pt vitals wnl, NST observed for another 20 minutes, reactive and reassuring, toco with 1 contraction in 20 minutes. Patient reports feeling contractions q2 minutes, lasting 20 seconds but not intense at this time. Patient in early labor.    Plan:  - patient to return to hospital in 4 hours for repeat VE or sooner should she experience leakage of fluid, vaginal bleeding, decreased fetal movement and increased frequency and intensity of contractions. Patient expressed understanding and agreed to this plan.  - patient aware of low-normal MARLON and counseled on PO hydration and importance of follow up        Discussed with Dr. Mcfadden HOMERO [id number]  S: 34y F  @ 38w2d presenting from Dr. Chauhan's office for a low MARLON of 3. Patient says Dr. Chauhan told her to hydrate and come to hospital for further monitoring and recheck of MARLON after oral hydration. Of note, patient had some tricking of fluid at noon today and at around 3pm, clear fluid. Patient reports having some contractions/cramping in triage but not regular.  Reports +FM, Denies VB.    Ante: Spontaneous, uncomplicated pregnancy. NIPT and sonograms wnl GCT negative. GBS positive. EFW 2977g    ObHx:   G1 - C section for nonreassuring FHT (, ~3000g)  G2 -  at 38 weeks, uncomplicated (~3000g)  G3 - current    GYNHx: Denies fibroids/ovarian cysts/abnormal pap smears/STI's  PMHx: Patent foramen ovale - follows with cardiology  SurgHx: Denies  Meds: PNV, ASA  No Known Allergies      PE  BP: 115/64 HR: 88  General: NAD; Lying comfortably in bed  Pulm: No increased work of breathing noted.   Abdomen: Soft, nontender, gravid.   Extremities: No calf tenderness or swelling noted bilaterally.     SSE: Normal external genitalia. Os visually dilated. No pooling on speculum. Nitrazine negative. Ferning negative.   SVE: 1.5/posterior    NST: baseline 145bpm, moderate variability, +accels, no decels  [place]: irregular contractions noted  TAUS: cephalic, placenta posterior, BPP 8/8. MARLON 5.7cm. US performed, printer ran out of paper    A/P: 34y F  @ 38w2d presenting from Dr. Chauhan's office for suspected ol rule out labor. Patient VE in office was 1.5, negative Nitrazine Patient s/p oral hydration, negative repeat Nitrazine negative pooling, repeat VE 1.5. BPP 8/8, MARLON improved 5.7cm.  - Fetal status reassuring on NST and BPP  - plan to keep patient on monitor for another 20 mintutes to observe contraction pattern longer, at that time will determine whether patient is truly in early labor.    D/w Dr. Mcfadden, attending  Jeannette Helm PA-C    Addendum:  Assessment: Pt vitals wnl, NST observed for another 20 minutes, reactive and reassuring, toco with 1 contraction in 20 minutes. Patient reports feeling contractions q2 minutes, lasting 20 seconds but not intense at this time. Patient in early labor.    Plan:  - Patient to return to hospital in 4 hours for repeat VE or sooner should she experience leakage of fluid, vaginal bleeding, decreased fetal movement and increased frequency and intensity of contractions. Patient expressed understanding and agreed to this plan.  - Patient aware of low-normal MARLNO and counseled on PO hydration and importance of follow up        Discussed with Dr. Mcfadden

## 2025-02-04 NOTE — OB PROVIDER TRIAGE NOTE - NSOBPROVIDERNOTE_OBGYN_ALL_OB_FT
34y  presenting for r/o oligohydramnios and early labor  - Fetal status reassuring on NST and BPP  - r/o oligo hydramnios: MARLON of 7.5cm  - r/o early labor: VE unchanged from earlier today. unlikely patient is in early labor  - Kick counts explained  -Patient encouraged to continue to hydrate PO  - Follow up as scheduled  - Call OB or return to hospital for abdominal pain/contractions, vaginal bleeding, leakage of fluid, or decreased fetal movement.    D/w Dr. Maria De Jesus Mckinley PA-C    Discussed with

## 2025-02-04 NOTE — OB PROVIDER TRIAGE NOTE - CURRENT PREGNANCY COMPLICATIONS, OB PROFILE
Patent foramen ovale/Cardiac Disease [General Appearance - Alert] : alert [General Appearance - In No Acute Distress] : in no acute distress [Sclera] : the sclera and conjunctiva were normal [PERRL With Normal Accommodation] : pupils were equal in size, round, and reactive to light [Extraocular Movements] : extraocular movements were intact [Oropharynx] : the oropharynx was normal [Outer Ear] : the ears and nose were normal in appearance [Neck Appearance] : the appearance of the neck was normal [Neck Cervical Mass (___cm)] : no neck mass was observed [Thyroid Diffuse Enlargement] : the thyroid was not enlarged [Jugular Venous Distention Increased] : there was no jugular-venous distention [Thyroid Nodule] : there were no palpable thyroid nodules [Auscultation Breath Sounds / Voice Sounds] : lungs were clear to auscultation bilaterally [Heart Rate And Rhythm] : heart rate was normal and rhythm regular [Heart Sounds] : normal S1 and S2 [Heart Sounds Pericardial Friction Rub] : no pericardial rub [Heart Sounds Gallop] : no gallops [Systolic grade ___/6] : A grade [unfilled]/6 systolic murmur was heard. [Abdominal Aorta] : the abdominal aorta was normal [Arterial Pulses Femoral] : femoral pulses were normal without bruits [Full Pulse] : the pedal pulses are present [Edema] : there was no peripheral edema [Veins - Varicosity Changes] : there were no varicosital changes [Bowel Sounds] : normal bowel sounds [Abdomen Soft] : soft [Abdomen Tenderness] : non-tender [Abdomen Mass (___ Cm)] : no abdominal mass palpated [Cervical Lymph Nodes Enlarged Posterior Bilaterally] : posterior cervical [Cervical Lymph Nodes Enlarged Anterior Bilaterally] : anterior cervical [Supraclavicular Lymph Nodes Enlarged Bilaterally] : supraclavicular [Axillary Lymph Nodes Enlarged Bilaterally] : axillary [Femoral Lymph Nodes Enlarged Bilaterally] : femoral [Inguinal Lymph Nodes Enlarged Bilaterally] : inguinal [No Spinal Tenderness] : no spinal tenderness [Abnormal Walk] : normal gait [Nail Clubbing] : no clubbing  or cyanosis of the fingernails [Musculoskeletal - Swelling] : no joint swelling seen [Motor Tone] : muscle strength and tone were normal [Skin Color & Pigmentation] : normal skin color and pigmentation [Skin Turgor] : normal skin turgor [] : no rash [Cranial Nerves] : cranial nerves 2-12 were intact [No Focal Deficits] : no focal deficits [Oriented To Time, Place, And Person] : oriented to person, place, and time [Impaired Insight] : insight and judgment were intact [Affect] : the affect was normal [FreeTextEntry1] : Obese

## 2025-02-04 NOTE — OB RN TRIAGE NOTE - NSNURSINGINSTR_OBGYN_ALL_OB_FT
Pt discharged home. SUNDAY MAXWELL reviewed discharge instructions with pt. All questions and concerns were addressed. Pt verbalized understanding. No further actions needed. Pt stable upon leaving unit.

## 2025-02-04 NOTE — OB RN TRIAGE NOTE - FALL HARM RISK - UNIVERSAL INTERVENTIONS
Bed in lowest position, wheels locked, appropriate side rails in place/Call bell, personal items and telephone in reach/Instruct patient to call for assistance before getting out of bed or chair/Non-slip footwear when patient is out of bed/[place] to call system/Physically safe environment - no spills, clutter or unnecessary equipment/Purposeful Proactive Rounding/Room/bathroom lighting operational, light cord in reach

## 2025-02-05 ENCOUNTER — INPATIENT (INPATIENT)
Facility: HOSPITAL | Age: 35
LOS: 1 days | Discharge: HOME | DRG: 833 | End: 2025-02-07
Attending: OBSTETRICS & GYNECOLOGY | Admitting: OBSTETRICS & GYNECOLOGY
Payer: COMMERCIAL

## 2025-02-05 VITALS
HEART RATE: 96 BPM | SYSTOLIC BLOOD PRESSURE: 116 MMHG | OXYGEN SATURATION: 98 % | DIASTOLIC BLOOD PRESSURE: 70 MMHG | TEMPERATURE: 98 F | RESPIRATION RATE: 18 BRPM

## 2025-02-05 DIAGNOSIS — O26.899 OTHER SPECIFIED PREGNANCY RELATED CONDITIONS, UNSPECIFIED TRIMESTER: ICD-10-CM

## 2025-02-05 DIAGNOSIS — Z98.891 HISTORY OF UTERINE SCAR FROM PREVIOUS SURGERY: Chronic | ICD-10-CM

## 2025-02-05 LAB
BASOPHILS # BLD AUTO: 0.03 K/UL — SIGNIFICANT CHANGE UP (ref 0–0.2)
BASOPHILS NFR BLD AUTO: 0.3 % — SIGNIFICANT CHANGE UP (ref 0–2)
BLD GP AB SCN SERPL QL: NEGATIVE — SIGNIFICANT CHANGE UP
EOSINOPHIL # BLD AUTO: 0.09 K/UL — SIGNIFICANT CHANGE UP (ref 0–0.5)
EOSINOPHIL NFR BLD AUTO: 0.9 % — SIGNIFICANT CHANGE UP (ref 0–6)
HCT VFR BLD CALC: 34.3 % — LOW (ref 34.5–45)
HGB BLD-MCNC: 11.6 G/DL — SIGNIFICANT CHANGE UP (ref 11.5–15.5)
IMM GRANULOCYTES NFR BLD AUTO: 0.5 % — SIGNIFICANT CHANGE UP (ref 0–0.9)
LYMPHOCYTES # BLD AUTO: 2.06 K/UL — SIGNIFICANT CHANGE UP (ref 1–3.3)
LYMPHOCYTES # BLD AUTO: 21.2 % — SIGNIFICANT CHANGE UP (ref 13–44)
MCHC RBC-ENTMCNC: 30.9 PG — SIGNIFICANT CHANGE UP (ref 27–34)
MCHC RBC-ENTMCNC: 33.8 G/DL — SIGNIFICANT CHANGE UP (ref 32–36)
MCV RBC AUTO: 91.5 FL — SIGNIFICANT CHANGE UP (ref 80–100)
MONOCYTES # BLD AUTO: 0.59 K/UL — SIGNIFICANT CHANGE UP (ref 0–0.9)
MONOCYTES NFR BLD AUTO: 6.1 % — SIGNIFICANT CHANGE UP (ref 2–14)
NEUTROPHILS # BLD AUTO: 6.89 K/UL — SIGNIFICANT CHANGE UP (ref 1.8–7.4)
NEUTROPHILS NFR BLD AUTO: 71 % — SIGNIFICANT CHANGE UP (ref 43–77)
NRBC # BLD: 0 /100 WBCS — SIGNIFICANT CHANGE UP (ref 0–0)
NRBC BLD-RTO: 0 /100 WBCS — SIGNIFICANT CHANGE UP (ref 0–0)
PLATELET # BLD AUTO: 146 K/UL — LOW (ref 150–400)
RBC # BLD: 3.75 M/UL — LOW (ref 3.8–5.2)
RBC # FLD: 12.7 % — SIGNIFICANT CHANGE UP (ref 10.3–14.5)
RH IG SCN BLD-IMP: POSITIVE — SIGNIFICANT CHANGE UP
WBC # BLD: 9.71 K/UL — SIGNIFICANT CHANGE UP (ref 3.8–10.5)
WBC # FLD AUTO: 9.71 K/UL — SIGNIFICANT CHANGE UP (ref 3.8–10.5)

## 2025-02-05 RX ORDER — SODIUM CITRATE AND CITRIC ACID MONOHYDRATE 1002; 1500 MG/15ML; MG/15ML
15 SOLUTION ORAL EVERY 6 HOURS
Refills: 0 | Status: DISCONTINUED | OUTPATIENT
Start: 2025-02-05 | End: 2025-02-06

## 2025-02-05 RX ORDER — OXYTOCIN/0.9 % SODIUM CHLORIDE 10/500ML
167 PLASTIC BAG, INJECTION (ML) INTRAVENOUS
Qty: 30 | Refills: 0 | Status: DISCONTINUED | OUTPATIENT
Start: 2025-02-05 | End: 2025-02-06

## 2025-02-05 RX ORDER — FENTANYL/BUPIVACAINE/NS/PF 2MCG/ML-.1
250 PLASTIC BAG, INJECTION (ML) INJECTION
Refills: 0 | Status: DISCONTINUED | OUTPATIENT
Start: 2025-02-05 | End: 2025-02-06

## 2025-02-05 RX ORDER — ANTISEPTIC SURGICAL SCRUB 0.04 MG/ML
1 SOLUTION TOPICAL DAILY
Refills: 0 | Status: DISCONTINUED | OUTPATIENT
Start: 2025-02-05 | End: 2025-02-06

## 2025-02-05 RX ORDER — SODIUM CHLORIDE 9 G/ML
1000 INJECTION, SOLUTION INTRAVENOUS
Refills: 0 | Status: DISCONTINUED | OUTPATIENT
Start: 2025-02-05 | End: 2025-02-06

## 2025-02-05 RX ADMIN — Medication 216 GRAM(S): at 21:36

## 2025-02-05 NOTE — OB RN PATIENT PROFILE - CENTRAL VENOUS CATHETER
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 Daughter reports patient was unable to follow commands and falling asleep easily. Upon arrival to the ER, vitals revealed elevated temperature reading and mildly elevated BP. Labs were significant for leukocytosis, hypoglycemia with a glucose of 28, elevated CRP. Patient was given a D10 infusion with improvement of glucose to 126. CXR revealed prominent interstitial markings with no focal opacification, no acute cardiopulmonary process appreciated. CT head revealed no acute intracranial abnormality identified with chronic microvascular ischemic disease. Daughter at bedside brought patient's home medication bottles. Patient had 4 bottles of Metformin 1000mg BID which daughter reports patient only takes once daily and 3 bottles of Glimepiride 2mg BID. Daughter reports she gets help from a nurse who comes to her home to organize the patient's medication therefore she does not believe he could have taken too much medication. On exam, CBG registered as <20. He did receive a one time dose of Octreotide 50mcg and D5NS at 100cc/hr. Patient is being admitted to hospital medicine service.      His glucose improved after the dose of octreotide.  D5 NS was able to be discontinued.  He is now on metformin a 1000 mg b.i.d. and tolerating well.  Blood pressure was noted to be elevated and home lisinopril has been increased to 20 mg daily starting today.  Patient is being  transitioned to our swing unit for continued therapy while awaiting placement at nursing home.    Subjective:     Principal Problem:Hypoglycemia associated with diabetes    Interval History:   Doing well this am, no new complaints.     Review of Systems   All other systems reviewed and are negative.    Objective:     Vital Signs (Most Recent):  Temp: 97.7 °F (36.5 °C) (05/29/24 1940)  Pulse: 82 (05/29/24 1940)  Resp: 18 (05/28/24 1932)  BP: (!) 144/75 (05/29/24 1940)  SpO2: 96 % (05/29/24 1942) Vital Signs (24h Range):  Temp:  [97.7 °F (36.5 °C)-98 °F (36.7 °C)] 97.7 °F (36.5 °C)  Pulse:  [82-87] 82  SpO2:  [96 %-100 %] 96 %  BP: (144-146)/(75-84) 144/75     Weight: 75.2 kg (165 lb 12.6 oz)  Body mass index is 25.21 kg/m².    Intake/Output Summary (Last 24 hours) at 5/29/2024 2055  Last data filed at 5/29/2024 1817  Gross per 24 hour   Intake 360 ml   Output 900 ml   Net -540 ml      Physical Exam  Vitals reviewed. Exam conducted with a chaperone present.   Constitutional:       Appearance: Normal appearance. He is normal weight.   HENT:      Head: Normocephalic and atraumatic.      Nose: Nose normal.      Mouth/Throat:      Mouth: Mucous membranes are dry.      Pharynx: Oropharynx is clear.   Eyes:      Conjunctiva/sclera: Conjunctivae normal.      Pupils: Pupils are equal, round, and reactive to light.   Cardiovascular:      Rate and Rhythm: Normal rate and regular rhythm.      Pulses: Normal pulses.      Heart sounds: Normal heart sounds.   Pulmonary:      Effort: Pulmonary effort is normal.      Breath sounds: Normal breath sounds.   Abdominal:      General: Abdomen is flat. Bowel sounds are normal.   Musculoskeletal:         General: Normal range of motion.      Cervical back: Normal range of motion.   Skin:     General: Skin is warm.   Neurological:      General: No focal deficit present.      Mental Status: He is alert. Mental status is at baseline.         Significant Labs: All pertinent labs within the past  24 hours have been reviewed.  Recent Lab Results  (Last 5 results in the past 24 hours)        05/29/24  2015   05/29/24  1948   05/29/24  1114   05/29/24  0610   05/29/24  0610        POC Glucose 156         104       POCT Glucose   156   133   104                                Significant Imaging: I have reviewed all pertinent imaging results/findings within the past 24 hours.  I have reviewed and interpreted all pertinent imaging results/findings within the past 24 hours.    Assessment/Plan:      Active Diagnoses:    Diagnosis Date Noted POA    PRINCIPAL PROBLEM:  Hypoglycemia associated with diabetes [E11.649] 05/13/2024 Yes      Problems Resolved During this Admission:     VTE Risk Mitigation (From admission, onward)           Ordered     enoxaparin injection 40 mg  Daily         05/21/24 1239     IP VTE HIGH RISK PATIENT  Once         05/21/24 1239     Place sequential compression device  Until discontinued         05/21/24 1239                   Sulfonylurea induced hypoglycemia   Uncontrolled DM2   Hold home Glimepiride   Received D10 in ER, Octreotide 50mcg  A1c 6%   Continue Metformin to 1000mg BID and Jardiance 25mg QD      Acute metabolic encephalopathy - resolved   2/2 hypoglycemia and receiving sedating medications in the ER   Limit sedating medications   CT head revealed no acute intracranial abnormality identified with chronic microvascular ischemic disease     Leukocytosis - resolved   UA without UTI   CXR without acute findings     Covid, flu, RSV negative      HTN  Continue Lisinopril to 20mg on 05/21/2024     BPH  Continue home Flomax     Iron deficiency   Continue home iron supplementation     Bipolar disorder, insomnia, dementia  Continue home Mirtazapine   [facility]      S/p left BKA 2/2 gangrene   PT/OT - needs appointment with [facility] due to poorly fitted prosthetic      Hyperkalemia - resolved  Required 1 dose of Kayexalate      DVT prophylaxis:  Lovenox     Services provided via two way audio/visual telecommunication  Provider location: [place], [place]  Patient location: [place]     Agapito Yates MD  Department of Hospital Medicine   [facility]     no

## 2025-02-05 NOTE — PRE-ANESTHESIA EVALUATION ADULT - NSANTHOSAYNRD_GEN_A_CORE
No. TEJAS screening performed.  STOP BANG Legend: 0-2 = LOW Risk; 3-4 = INTERMEDIATE Risk; 5-8 = HIGH Risk
yes

## 2025-02-05 NOTE — OB PROVIDER H&P - NSLOWPPHRISK_OBGYN_A_OB
Chin Pregnancy/Less than or equal to 4 previous vaginal births/No known bleeding disorder/No history of postpartum hemorrhage/No other PPH risks indicated

## 2025-02-05 NOTE — OB RN PATIENT PROFILE - FALL HARM RISK - UNIVERSAL INTERVENTIONS
Bed in lowest position, wheels locked, appropriate side rails in place/Call bell, personal items and telephone in reach/Instruct patient to call for assistance before getting out of bed or chair/Non-slip footwear when patient is out of bed/[place] to call system/Physically safe environment - no spills, clutter or unnecessary equipment/Purposeful Proactive Rounding/Room/bathroom lighting operational, light cord in reach

## 2025-02-05 NOTE — OB RN PATIENT PROFILE - NS TRANSFER EYEGLASSES PAIRS
SUBJECTIVE:   Nadia Mendez is a 34 year old female who presents to clinic today for the following health issues:      ENT Symptoms             Symptoms: cc Present Absent Comment   Fever/Chills  x  chills   Fatigue  x     Muscle Aches  x     Eye Irritation  x     Sneezing  x     Nasal Satya/Drg  x     Sinus Pressure/Pain  x     Loss of smell  x     Dental pain  x     Sore Throat  x     Swollen Glands   x    Ear Pain/Fullness  x     Cough  x     Wheeze   x    Chest Pain   x    Shortness of breath   x    Rash   x    Other         Symptom duration:  about 1 month   Symptom severity:  moderate   Treatments tried:  Sudafed   Contacts:       Feels like sinus infection. Seems like getting better then worsens.     Back Pain       Duration: about 1 week        Specific cause: none    Description:   Location of pain: low back bilateral  Character of pain: dull ache, cramping and intermittent  Pain radiation:radiates into the right hip and radiates into the left hip  New numbness or weakness in legs, not attributed to pain:  no     Intensity: Currently 4/10, At its worst 8/10    History:   Pain interferes with job: No  History of back problems: YES prior back problems  Any previous MRI or X-rays: Yes--at .  Date 2010  Sees a specialist for back pain:  No  Therapies tried without relief: acetaminophen (Tylenol), cold and heat    Alleviating factors:   Improved by: none      Precipitating factors:  Worsened by: Lying Flat          Accompanying Signs & Symptoms:  Risk of Fracture:  None  Risk of Cauda Equina:  None  Risk of Infection:  None  Risk of Cancer:  None  Risk of Ankylosing Spondylitis:  Onset at age <35, male, AND morning back stiffness. no     Pain sometimes radiates to hip and mid thighNo loss of bowel or bladder. No saddle anesthesia. Had massage 2 weeks ago. No known injury.      Problem list and histories reviewed & adjusted, as indicated.  Additional history: as documented    Patient Active Problem List    Diagnosis     CARDIOVASCULAR SCREENING; LDL GOAL LESS THAN 160     Dysplasia of cervix, low grade (GEN 1)     Generalized anxiety disorder     Migraine with aura and without status migrainosus, not intractable     Gastroesophageal reflux disease with esophagitis     Genital herpes     Right wrist pain     Wrist weakness     Past Surgical History:   Procedure Laterality Date     EXAM OF VAGINA,COLPOSCOPY  2013, 2016       Social History     Tobacco Use     Smoking status: Never Smoker     Smokeless tobacco: Never Used     Tobacco comment: no second hand smoke   Substance Use Topics     Alcohol use: No     Alcohol/week: 0.0 oz     Family History   Problem Relation Age of Onset     Cerebrovascular Disease Father 50     Hypertension Father      Diabetes Maternal Grandmother      Cancer No family hx of      Thyroid Disease No family hx of      Glaucoma No family hx of      Macular Degeneration No family hx of      Retinal detachment No family hx of          Current Outpatient Medications   Medication Sig Dispense Refill     amoxicillin-clavulanate (AUGMENTIN) 875-125 MG tablet Take 1 tablet by mouth 2 times daily for 10 days 20 tablet 0     methocarbamol (ROBAXIN) 750 MG tablet Take 1 tablet (750 mg) by mouth 3 times daily as needed for muscle spasms 20 tablet 0     naproxen (NAPROSYN) 500 MG tablet Take 1 tablet (500 mg) by mouth 2 times daily as needed for moderate pain 20 tablet 0     cetirizine (ZYRTEC) 10 MG tablet Take 1 tablet (10 mg) by mouth daily 90 tablet 3     cyclobenzaprine (FLEXERIL) 10 MG tablet Take 1 tablet (10 mg) by mouth nightly as needed for muscle spasms 20 tablet 0     fluticasone (FLONASE) 50 MCG/ACT spray Spray 2 sprays into both nostrils daily 16 mL 11     Iron, Ferrous Gluconate, 256 (28 Fe) MG TABS Take 325 mg by mouth       norethindrone (MICRONOR) 0.35 MG per tablet Take 1 tablet (0.35 mg) by mouth daily 84 tablet 3     olopatadine HCl (PATADAY) 0.2 % SOLN Place 1 drop into both eyes  "daily 2.5 mL 3     Allergies   Allergen Reactions     Percocet [Oxycodone-Acetaminophen] Rash       Reviewed and updated as needed this visit by clinical staff  Tobacco  Allergies  Meds  Problems  Med Hx  Surg Hx  Fam Hx       Reviewed and updated as needed this visit by Provider  Tobacco  Allergies  Meds  Problems  Med Hx  Surg Hx  Fam Hx         ROS:  Constitutional, HEENT, cardiovascular, pulmonary, GI, , musculoskeletal, neuro, skin, endocrine and psych systems are negative, except as otherwise noted.    OBJECTIVE:     /83 (BP Location: Left arm, Patient Position: Chair, Cuff Size: Adult Regular)   Pulse 84   Temp 98  F (36.7  C) (Oral)   Resp 18   Ht 1.702 m (5' 7\")   Wt 80.7 kg (178 lb)   LMP 12/03/2018 (Exact Date)   SpO2 99%   Breastfeeding? No   BMI 27.88 kg/m   LMP 12/3/2018  Body mass index is 27.88 kg/m .  GENERAL: healthy, alert and no distress  EYES: Eyes grossly normal to inspection, PERRL and conjunctivae and sclerae normal  HENT: ear canals and TM's normal, nose and mouth without ulcers or lesions  NECK: no adenopathy, no asymmetry, masses, or scars and thyroid normal to palpation  RESP: lungs clear to auscultation - no rales, rhonchi or wheezes  CV: regular rate and rhythm, normal S1 S2, no S3 or S4, no murmur, click or rub, no peripheral edema and peripheral pulses strong  MS: no gross musculoskeletal defects noted, no edema  NEURO: Normal strength and tone, mentation intact and speech normal  Comprehensive back pain exam:  Tenderness of low back, Range of motion not limited by pain, Lower extremity strength functional and equal on both sides, Lower extremity reflexes within normal limits bilaterally, Lower extremity sensation normal and equal on both sides and Straight leg raise negative bilaterally  PSYCH: mentation appears normal, affect normal/bright    Diagnostic Test Results:  none     ASSESSMENT/PLAN:       ICD-10-CM    1. Acute sinusitis, recurrence not " specified, unspecified location J01.90 amoxicillin-clavulanate (AUGMENTIN) 875-125 MG tablet   2. Acute right-sided low back pain without sciatica M54.5 methocarbamol (ROBAXIN) 750 MG tablet     naproxen (NAPROSYN) 500 MG tablet     Push fluids, rest  Saline nasal irrigation or can try a intranasal decongestant such as Afrin but not more than three days. Can also try Flonase 2 sprays each nostril daily x2 weeks   Start antibiotic today - Augmentin 1 tab every 12 hours x10 days. Take with food. Use a backup form of birth control while on antibiotics and for 1 week after   Tylenol/ibuprofen as needed for pain/fever  If worsening or not improving, follow up with primary care provider in 5 days, sooner if needed    Ice or heat 15 minutes 4-6 times daily  Gentle stretching  Movement is key for early relief of back pain  Start methocarbamol for muscle pain/spasms. It is a muscle relaxer. No driving, operating machinery, or drinking alcohol while taking.  If worsening or not improving in 2 weeks please be re-evaluated  If you develop loss of bowel or bladder or numbness in the groin or thighs go to emergency department.    See Patient Instructions    The benefits, risks and potential side effects were discussed in detail. Black box warnings discussed as relevant. All patient questions were answered. The patient was instructed to follow up immediately if any adverse reactions develop.    Patient verbalizes understanding and agrees with plan of care. Patient stable for discharge.      ANASTASIYA [facility]   1 pair

## 2025-02-05 NOTE — OB RN TRIAGE NOTE - FALL HARM RISK - UNIVERSAL INTERVENTIONS
No
Bed in lowest position, wheels locked, appropriate side rails in place/Call bell, personal items and telephone in reach/Instruct patient to call for assistance before getting out of bed or chair/Non-slip footwear when patient is out of bed/[place] to call system/Physically safe environment - no spills, clutter or unnecessary equipment/Purposeful Proactive Rounding/Room/bathroom lighting operational, light cord in reach

## 2025-02-05 NOTE — PRE-ANESTHESIA EVALUATION ADULT - NSANTHPMHFT_GEN_ALL_CORE
PMHx: Hx PFO accidentally found last year,  advised to follow with Cardiology, pt forgot    PSxHx:  2/2 RFHRT    OB Hx:  with IUP 38 week 3 day in Labor            G1- CSx 2/2 NRFHRT            G2- successful             G3-now

## 2025-02-05 NOTE — OB PROVIDER H&P - HISTORY OF PRESENT ILLNESS
34y F  @ 38w2d presenting with contractions. Patient states that she was seen in the office earlier today and had a membrane sweep. Patient states that she then began robinson every 3-6min around 1645. Since this time, the patient states that her contractions have increased in intensity and have become longer. Patient endorses fetal movement. She denies any leakage of fluid or vaginal bleeding at this time.     Ante: Spontaneous, uncomplicated pregnancy. NIPT and sonograms wnl GCT negative. GBS positive. EFW 2977g    ObHx:   G1 - C section for nonreassuring FHT (, ~3000g)  G2 -  at 38 weeks, uncomplicated (~3000g)  G3 - current    GYNHx: Denies fibroids/ovarian cysts/abnormal pap smears/STI's  PMHx: Patent foramen ovale - follows with cardiology  SurgHx: see OB hx. denies   Meds: PNV, ASA 81mg BID for PFO   Allergies: NKDA    PE  BP: 116/70  P: 98  General: NAD; Lying comfortably in bed, breathing through contractions  Pulm: No increased work of breathing noted.   Abdomen: Soft, nontender, gravid.   Extremities: No calf tenderness or swelling noted bilaterally.     SVE: 1.5/50/-3    NST: baseline 140bpm, moderate variability, +accels, no decels  Bowman: irregular contractions   TAUS: cephalic, placenta posterior     34y F  @ 38w3d presenting with contractions. Patient states that she was seen in the office earlier today and had a membrane sweep. Patient states that she then began robinson every 3-6min around 1645. Since this time, the patient states that her contractions have increased in intensity and have become longer. Patient endorses fetal movement. She denies any leakage of fluid or vaginal bleeding at this time.     Ante: Spontaneous, uncomplicated pregnancy. NIPT and sonograms wnl GCT negative. GBS positive. EFW 2977g    ObHx:   G1 - C section for nonreassuring FHT (, ~3000g)  G2 -  at 38 weeks, uncomplicated (2855g)  G3 - current    GYNHx: Denies fibroids/ovarian cysts/abnormal pap smears/STI's  PMHx: Patent foramen ovale - follows with cardiology  SurgHx: see OB hx. denies   Meds: PNV, ASA 81mg BID for PFO   Allergies: NKDA    PE  BP: 116/70  P: 98  General: NAD; Lying comfortably in bed, breathing through contractions  Pulm: No increased work of breathing noted.   Abdomen: Soft, nontender, gravid.   Extremities: No calf tenderness or swelling noted bilaterally.     SVE: 1.5/50/-3    NST: baseline 140bpm, moderate variability, +accels, no decels  [place]: irregular contractions   TAUS: cephalic, placenta posterior

## 2025-02-05 NOTE — OB PROVIDER H&P - ASSESSMENT
34y  @ 38w2d who is admitted for early labor/trial of labor after c/s  - Admit to L&D  - NPO/IV Hydration  - Continuous EFM and toco  - Routine labs ordered  - Prenatal labs reviewed, GBS (+) amp ordered for infection ppx  - Patient requesting epidural. Anesthesia consulted  - Plan for trial of labor   - Consents obtained for trail of labor after c/s    Discussed with Dr. Juan Mckinley PA-C

## 2025-02-06 DIAGNOSIS — O99.413 DISEASES OF THE CIRCULATORY SYSTEM COMPLICATING PREGNANCY, THIRD TRIMESTER: ICD-10-CM

## 2025-02-06 DIAGNOSIS — Q21.12 PATENT FORAMEN OVALE: ICD-10-CM

## 2025-02-06 DIAGNOSIS — Z3A.38 38 WEEKS GESTATION OF PREGNANCY: ICD-10-CM

## 2025-02-06 DIAGNOSIS — O34.219 MATERNAL CARE FOR UNSPECIFIED TYPE SCAR FROM PREVIOUS CESAREAN DELIVERY: ICD-10-CM

## 2025-02-06 DIAGNOSIS — O47.1 FALSE LABOR AT OR AFTER 37 COMPLETED WEEKS OF GESTATION: ICD-10-CM

## 2025-02-06 PROBLEM — Z98.891 HISTORY OF UTERINE SCAR FROM PREVIOUS SURGERY: Chronic | Status: ACTIVE | Noted: 2025-02-04

## 2025-02-06 LAB
RH IG SCN BLD-IMP: POSITIVE — SIGNIFICANT CHANGE UP
T PALLIDUM AB TITR SER: NEGATIVE — SIGNIFICANT CHANGE UP

## 2025-02-06 RX ORDER — BACTERIOSTATIC SODIUM CHLORIDE 0.9 %
3 VIAL (ML) INJECTION EVERY 8 HOURS
Refills: 0 | Status: DISCONTINUED | OUTPATIENT
Start: 2025-02-06 | End: 2025-02-07

## 2025-02-06 RX ORDER — IBUPROFEN 600 MG/1
600 TABLET, FILM COATED ORAL EVERY 6 HOURS
Refills: 0 | Status: DISCONTINUED | OUTPATIENT
Start: 2025-02-06 | End: 2025-02-07

## 2025-02-06 RX ORDER — KETOROLAC TROMETHAMINE 10 MG
30 TABLET ORAL ONCE
Refills: 0 | Status: DISCONTINUED | OUTPATIENT
Start: 2025-02-06 | End: 2025-02-06

## 2025-02-06 RX ORDER — WITCH HAZEL 86 ML/100ML
1 OIL ORAL; TOPICAL EVERY 4 HOURS
Refills: 0 | Status: DISCONTINUED | OUTPATIENT
Start: 2025-02-06 | End: 2025-02-07

## 2025-02-06 RX ORDER — PNV WITH CALCIUM NO.11/IRON/FA 65 MG-1 MG
1 TABLET ORAL DAILY
Refills: 0 | Status: DISCONTINUED | OUTPATIENT
Start: 2025-02-06 | End: 2025-02-07

## 2025-02-06 RX ORDER — CLOSTRIDIUM TETANI TOXOID ANTIGEN (FORMALDEHYDE INACTIVATED), CORYNEBACTERIUM DIPHTHERIAE TOXOID ANTIGEN (FORMALDEHYDE INACTIVATED), BORDETELLA PERTUSSIS TOXOID ANTIGEN (GLUTARALDEHYDE INACTIVATED), BORDETELLA PERTUSSIS FILAMENTOUS HEMAGGLUTININ ANTIGEN (FORMALDEHYDE INACTIVATED), BORDETELLA PERTUSSIS PERTACTIN ANTIGEN, AND BORDETELLA PERTUSSIS FIMBRIAE 2/3 ANTIGEN 5; 2; 2.5; 5; 3; 5 [LF]/.5ML; [LF]/.5ML; UG/.5ML; UG/.5ML; UG/.5ML; UG/.5ML
0.5 INJECTION, SUSPENSION INTRAMUSCULAR ONCE
Refills: 0 | Status: DISCONTINUED | OUTPATIENT
Start: 2025-02-06 | End: 2025-02-07

## 2025-02-06 RX ORDER — MAGNESIUM HYDROXIDE 400 MG/5ML
30 SUSPENSION, ORAL (FINAL DOSE FORM) ORAL
Refills: 0 | Status: DISCONTINUED | OUTPATIENT
Start: 2025-02-06 | End: 2025-02-07

## 2025-02-06 RX ORDER — DIPHENHYDRAMINE HCL 25 MG
25 CAPSULE ORAL EVERY 6 HOURS
Refills: 0 | Status: DISCONTINUED | OUTPATIENT
Start: 2025-02-06 | End: 2025-02-07

## 2025-02-06 RX ORDER — IBUPROFEN 600 MG/1
600 TABLET, FILM COATED ORAL EVERY 6 HOURS
Refills: 0 | Status: COMPLETED | OUTPATIENT
Start: 2025-02-06 | End: 2026-01-05

## 2025-02-06 RX ORDER — ACETAMINOPHEN 160 MG/5ML
975 SUSPENSION ORAL
Refills: 0 | Status: DISCONTINUED | OUTPATIENT
Start: 2025-02-06 | End: 2025-02-07

## 2025-02-06 RX ORDER — HYDROCORTISONE 1 %
1 CREAM (GRAM) TOPICAL EVERY 6 HOURS
Refills: 0 | Status: DISCONTINUED | OUTPATIENT
Start: 2025-02-06 | End: 2025-02-07

## 2025-02-06 RX ORDER — OXYTOCIN/0.9 % SODIUM CHLORIDE 10/500ML
167 PLASTIC BAG, INJECTION (ML) INTRAVENOUS
Qty: 30 | Refills: 0 | Status: DISCONTINUED | OUTPATIENT
Start: 2025-02-06 | End: 2025-02-07

## 2025-02-06 RX ORDER — OXYCODONE HYDROCHLORIDE 30 MG/1
5 TABLET ORAL ONCE
Refills: 0 | Status: DISCONTINUED | OUTPATIENT
Start: 2025-02-06 | End: 2025-02-07

## 2025-02-06 RX ORDER — OXYCODONE HYDROCHLORIDE 30 MG/1
5 TABLET ORAL
Refills: 0 | Status: DISCONTINUED | OUTPATIENT
Start: 2025-02-06 | End: 2025-02-07

## 2025-02-06 RX ORDER — PRAMOXINE HCL 1 %
1 CREAM (GRAM) RECTAL EVERY 4 HOURS
Refills: 0 | Status: DISCONTINUED | OUTPATIENT
Start: 2025-02-06 | End: 2025-02-07

## 2025-02-06 RX ADMIN — Medication 3 MILLILITER(S): at 06:00

## 2025-02-06 RX ADMIN — Medication 3 MILLILITER(S): at 12:13

## 2025-02-06 RX ADMIN — Medication 1 TABLET(S): at 15:48

## 2025-02-06 RX ADMIN — IBUPROFEN 600 MILLIGRAM(S): 600 TABLET, FILM COATED ORAL at 17:43

## 2025-02-06 RX ADMIN — Medication 30 MILLIGRAM(S): at 03:51

## 2025-02-06 RX ADMIN — Medication 167 MILLIUNIT(S)/MIN: at 02:59

## 2025-02-06 RX ADMIN — IBUPROFEN 600 MILLIGRAM(S): 600 TABLET, FILM COATED ORAL at 12:05

## 2025-02-06 RX ADMIN — ACETAMINOPHEN 975 MILLIGRAM(S): 160 SUSPENSION ORAL at 15:09

## 2025-02-06 RX ADMIN — ACETAMINOPHEN 975 MILLIGRAM(S): 160 SUSPENSION ORAL at 20:22

## 2025-02-06 RX ADMIN — Medication 30 MILLIGRAM(S): at 04:04

## 2025-02-06 NOTE — OB RN DELIVERY SUMMARY - NS_SEPSISRSKCALC_OBGYN_ALL_OB_FT
EOS calculated successfully. EOS Risk Factor: 0.5/1000 live births ([facility] national incidence); GA=38w4d; Temp=98.7; ROM=1.317; GBS='Positive'; Antibiotics='Broad spectrum antibiotics 2-3.9 hrs prior to birth'

## 2025-02-06 NOTE — LACTATION INITIAL EVALUATION - NS LACT CON REASON FOR REQ
38.4 wk gestation baby, about 13 hrs old at this time. Placed the baby STS with the mother while I provided breastfeeding education and explained normal  behaviour and the milk production feedback system. Assisted with positioning in a football hold and taught latch strategies. Baby was able to latch deeply and is feeding well, rhythmically sucking between short pauses of rest. Mother to continue with STS when possible, room-in, and feed as per cues at least 8-12x/ day. To f/u as needed./multiparous mom/patient request

## 2025-02-06 NOTE — OB RN DELIVERY SUMMARY - NSSELHIDDEN_OBGYN_ALL_OB_FT
[NS_DeliveryAttending1_OBGYN_ALL_OB_FT:[email or web address]=],[NS_CirculateRN2_OBGYN_ALL_OB_FT:[id number]=] [NS_DeliveryAttending1_OBGYN_ALL_OB_FT:[email or web address]=],[NS_CirculateRN2_OBGYN_ALL_OB_FT:[id number]=],[NS_DeliveryRN_OBGYN_ALL_OB_FT:[id number]=]

## 2025-02-06 NOTE — LACTATION INITIAL EVALUATION - LACTATION INTERVENTIONS
initiate/review safe skin-to-skin/initiate/review hand expression/initiate/review techniques for position and latch/post discharge community resources provided/review techniques to increase milk supply/review techniques to manage sore nipples/engorgement/reviewed components of an effective feeding and at least 8 effective feedings per day required/reviewed importance of monitoring infant diapers, the breastfeeding log, and minimum output each day/reviewed risks of artificial nipples/reviewed feeding on demand/by cue at least 8 times a day/reviewed indications of inadequate milk transfer that would require supplementation

## 2025-02-06 NOTE — OB PROVIDER LABOR PROGRESS NOTE - NS_SUBJECTIVE/OBJECTIVE_OBGYN_ALL_OB_FT
120 mod nanette +Accel -decel  ctx q2-3 min  cat I reassuring fetal acid base status  last exam 3/50/-3  epidural in situ  s/p AROM   continue to monitor, expectant management
VE performed 2-3/50/-3, pt comfortable with epidural, plan to AROM after second dose of ampicillin, will start pitocin for labor augmentation if contractions space out
VE performed 3/50/-3, AROMed clear fluid. pt comfortable with epidural

## 2025-02-07 ENCOUNTER — TRANSCRIPTION ENCOUNTER (OUTPATIENT)
Age: 35
End: 2025-02-07

## 2025-02-07 VITALS
TEMPERATURE: 98 F | SYSTOLIC BLOOD PRESSURE: 108 MMHG | HEART RATE: 72 BPM | RESPIRATION RATE: 18 BRPM | DIASTOLIC BLOOD PRESSURE: 69 MMHG | OXYGEN SATURATION: 98 %

## 2025-02-07 PROCEDURE — 86900 BLOOD TYPING SEROLOGIC ABO: CPT

## 2025-02-07 PROCEDURE — 59050 FETAL MONITOR W/REPORT: CPT

## 2025-02-07 PROCEDURE — 85025 COMPLETE CBC W/AUTO DIFF WBC: CPT

## 2025-02-07 PROCEDURE — 86850 RBC ANTIBODY SCREEN: CPT

## 2025-02-07 PROCEDURE — 36415 COLL VENOUS BLD VENIPUNCTURE: CPT

## 2025-02-07 PROCEDURE — 86901 BLOOD TYPING SEROLOGIC RH(D): CPT

## 2025-02-07 PROCEDURE — 86780 TREPONEMA PALLIDUM: CPT

## 2025-02-07 RX ORDER — IBUPROFEN 600 MG/1
1 TABLET, FILM COATED ORAL
Qty: 0 | Refills: 0 | DISCHARGE
Start: 2025-02-07

## 2025-02-07 RX ORDER — ACETAMINOPHEN 160 MG/5ML
3 SUSPENSION ORAL
Qty: 0 | Refills: 0 | DISCHARGE
Start: 2025-02-07

## 2025-02-07 RX ORDER — PNV WITH CALCIUM NO.11/IRON/FA 65 MG-1 MG
0 TABLET ORAL
Refills: 0 | DISCHARGE

## 2025-02-07 RX ORDER — ASPIRIN 81 MG/1
62 TABLET, COATED ORAL
Refills: 0 | DISCHARGE

## 2025-02-07 RX ADMIN — ACETAMINOPHEN 975 MILLIGRAM(S): 160 SUSPENSION ORAL at 01:16

## 2025-02-07 RX ADMIN — WITCH HAZEL 1 APPLICATION(S): 86 OIL ORAL; TOPICAL at 11:55

## 2025-02-07 RX ADMIN — Medication 1 TABLET(S): at 11:54

## 2025-02-07 RX ADMIN — Medication 1 APPLICATION(S): at 11:55

## 2025-02-07 RX ADMIN — Medication 1 SPRAY(S): at 11:55

## 2025-02-07 RX ADMIN — IBUPROFEN 600 MILLIGRAM(S): 600 TABLET, FILM COATED ORAL at 05:30

## 2025-02-07 RX ADMIN — IBUPROFEN 600 MILLIGRAM(S): 600 TABLET, FILM COATED ORAL at 11:54

## 2025-02-07 RX ADMIN — ACETAMINOPHEN 975 MILLIGRAM(S): 160 SUSPENSION ORAL at 09:04

## 2025-02-07 NOTE — DISCHARGE NOTE OB - NS MD DC FALL RISK RISK
For information on Fall & Injury Prevention, visit: [email or web address] OR  [email or web address] OR  https://www.cdc.gov/steadi/patient.html

## 2025-02-07 NOTE — DISCHARGE NOTE OB - MEDICATION SUMMARY - MEDICATIONS TO STOP TAKING
I will STOP taking the medications listed below when I get home from the hospital:    aspirin  -- 62 by mouth once a day

## 2025-02-07 NOTE — DISCHARGE NOTE OB - CARE PROVIDER_API CALL
Rhoda Mcfadden  Obstetrics and Gynecology  [address] [place] [address], [address]  [place] [address]  Phone: [phone]  Fax: [phone]  Follow Up Time:

## 2025-02-07 NOTE — DISCHARGE NOTE OB - FINANCIAL ASSISTANCE
[facility] provides services at a reduced cost to those who are determined to be eligible through [facility]’s financial assistance program. Information regarding [facility]’s financial assistance program can be found by going to [email or web address] or by calling [phone].

## 2025-02-07 NOTE — DISCHARGE NOTE OB - HOSPITAL COURSE
Patient admitted to L&D for vaginal birth after  delivery.  Intrapartum course uneventful.  Postpartum course uncomplicated.  Discharged home after meeting all milestones.

## 2025-02-07 NOTE — DISCHARGE NOTE OB - PATIENT PORTAL LINK FT
You can access the FollowMyHealth Patient Portal offered by [facility] by registering at the following website: [email or web address]. By joining [facility]’s FollowMyHealth portal, you will also be able to view your health information using other applications (apps) compatible with our system.

## 2025-02-07 NOTE — PROGRESS NOTE ADULT - SUBJECTIVE AND OBJECTIVE BOX
Patient evaluated at bedside this morning, resting comfortable in bed, no acute events overnight.  She reports pain is well controlled with tylenol and motrin.  She denies headache, dizziness, chest pain, palpitations, shortness of breath, nausea, vomiting, fever, chills, heavy vaginal bleeding. She has been ambulating without assistance, voiding spontaneously.  Tolerating food well, without nausea/vomit.      Physical Exam:  T(C): 36.7 (02-07-25 @ 06:17), Max: 36.9 (02-06-25 @ 19:47)  HR: 70 (02-07-25 @ 06:17) (68 - 81)  BP: 108/65 (02-07-25 @ 06:17) (102/63 - 108/65)  RR: 17 (02-07-25 @ 06:17) (17 - 18)  SpO2: 97% (02-07-25 @ 06:17) (96% - 97%)    GA: NAD, A&O x 3  Pulm: no increased work of breathing  Abd: soft, nontender, nondistended, no rebound or guarding, uterus firm.  Extremities: no calf tenderness                          11.6   9.71  )-----------( 146      ( 05 Feb 2025 20:55 )             34.3           acetaminophen     Tablet .. 975 milliGRAM(s) Oral <User Schedule>  benzocaine 20%/menthol 0.5% Spray 1 Spray(s) Topical every 6 hours PRN  dibucaine 1% Ointment 1 Application(s) Topical every 6 hours PRN  diphenhydrAMINE 25 milliGRAM(s) Oral every 6 hours PRN  diphtheria/tetanus/pertussis (acellular) Vaccine (Adacel) 0.5 milliLiter(s) IntraMuscular once  hydrocortisone 1% Cream 1 Application(s) Topical every 6 hours PRN  ibuprofen  Tablet. 600 milliGRAM(s) Oral every 6 hours  lanolin Ointment 1 Application(s) Topical every 6 hours PRN  magnesium hydroxide Suspension 30 milliLiter(s) Oral two times a day PRN  oxyCODONE    IR 5 milliGRAM(s) Oral every 3 hours PRN  oxyCODONE    IR 5 milliGRAM(s) Oral once PRN  oxytocin Infusion 167 milliUNIT(s)/Min IV Continuous <Continuous>  pramoxine 1%/zinc 5% Cream 1 Application(s) Topical every 4 hours PRN  prenatal multivitamin 1 Tablet(s) Oral daily  simethicone 80 milliGRAM(s) Chew every 4 hours PRN  sodium chloride 0.9% lock flush 3 milliLiter(s) IV Push every 8 hours  witch hazel Pads 1 Application(s) Topical every 4 hours PRN

## 2025-02-07 NOTE — DISCHARGE NOTE OB - PLAN OF CARE
Take Motrin 600mg every 6 hours and/or tylenol 650mg every 6 hours as needed for pain. Call your OB to schedule a follow up appointment in 6 weeks. Nothing per vagina until cleared by your OB - no intercourse, douching, tampons, etc.  Call your OB if you experience severe abdominal pain not improved by oral pain medications, heavy bright red vaginal bleeding saturating more than 1 pad per hour, or fever greater than 100.4F. Consider contraception options to be discussed with your OB. If you notice feelings of depression and or anxiety, you can schedule an appointment with  [facility] at [phone]

## 2025-02-07 NOTE — DISCHARGE NOTE OB - CARE PLAN
1 Principal Discharge DX:	Postpartum state  Assessment and plan of treatment:	Take Motrin 600mg every 6 hours and/or tylenol 650mg every 6 hours as needed for pain. Call your OB to schedule a follow up appointment in 6 weeks. Nothing per vagina until cleared by your OB - no intercourse, douching, tampons, etc.  Call your OB if you experience severe abdominal pain not improved by oral pain medications, heavy bright red vaginal bleeding saturating more than 1 pad per hour, or fever greater than 100.4F. Consider contraception options to be discussed with your OB. 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

## 2025-02-12 DIAGNOSIS — Z28.09 IMMUNIZATION NOT CARRIED OUT BECAUSE OF OTHER CONTRAINDICATION: ICD-10-CM

## 2025-02-12 DIAGNOSIS — Z3A.38 38 WEEKS GESTATION OF PREGNANCY: ICD-10-CM

## 2025-02-12 DIAGNOSIS — Q21.12 PATENT FORAMEN OVALE: ICD-10-CM

## 2025-02-12 DIAGNOSIS — Z79.82 LONG TERM (CURRENT) USE OF ASPIRIN: ICD-10-CM

## 2025-02-12 DIAGNOSIS — O34.211 MATERNAL CARE FOR LOW TRANSVERSE SCAR FROM PREVIOUS CESAREAN DELIVERY: ICD-10-CM

## 2025-07-21 NOTE — DISCHARGE NOTE OB - IF BREASTFEEDING, ADD A TOTAL OF 500 EXTRA CALORIES EACH DAY
Patient ID: Ryne Bishop is a 48 y.o. female established patient presents for the following:      Subjective:     Primary historian: patient    Annual Exam and Joint Pain (Wrist, both primarily  left/)       HPI   She states that she has bilateral wrist pain and stiffness in thumb and is unable to do planks open handed due to pain.       No past medical history on file.    Past Surgical History:   Procedure Laterality Date    IMPLANT BREAST SILICONE/EQ  2006       Current Outpatient Medications   Medication Sig Dispense Refill    estradiol (CLIMARA) 0.05 MG/24HR       progesterone (PROMETRIUM) 100 MG CAPS capsule       busPIRone (BUSPAR) 10 MG tablet Take 1 tablet by mouth 2 times daily 180 tablet 3    Drospirenone-Estetrol (NEXTSTELLIS PO) Take by mouth      venlafaxine (EFFEXOR XR) 37.5 MG extended release capsule Take 1 capsule by mouth daily 30 capsule 3    pantoprazole (PROTONIX) 20 MG tablet Take 1 tablet by mouth daily 30 tablet 3    fluticasone (FLONASE) 50 MCG/ACT nasal spray by Nasal route AS NEEDED      cetirizine (ZYRTEC) 10 MG tablet Take 1 tablet by mouth daily AS NEEDED      fluconazole (DIFLUCAN) 150 MG tablet Take 1 tablet by mouth every 72 hours (Patient not taking: Reported on 7/21/2025) 3 tablet 0     No current facility-administered medications for this visit.          ROS   Review of Systems   Constitutional:  Negative for chills, fatigue and fever.   HENT:  Negative for ear pain, hearing loss, sore throat and trouble swallowing.    Eyes: Negative.    Respiratory:  Negative for cough, chest tightness, shortness of breath and wheezing.    Cardiovascular:  Negative for chest pain, palpitations and leg swelling.   Gastrointestinal:  Negative for abdominal pain, blood in stool, constipation, diarrhea, nausea and vomiting.   Endocrine: Negative for polydipsia, polyphagia and polyuria.   Genitourinary:  Negative for flank pain, hematuria, pelvic pain, vaginal bleeding, vaginal discharge  Statement Selected